# Patient Record
Sex: MALE | Race: WHITE | NOT HISPANIC OR LATINO | Employment: OTHER | URBAN - METROPOLITAN AREA
[De-identification: names, ages, dates, MRNs, and addresses within clinical notes are randomized per-mention and may not be internally consistent; named-entity substitution may affect disease eponyms.]

---

## 2018-07-07 ENCOUNTER — OFFICE VISIT (OUTPATIENT)
Dept: URGENT CARE | Facility: CLINIC | Age: 63
End: 2018-07-07
Payer: COMMERCIAL

## 2018-07-07 VITALS
HEIGHT: 75 IN | DIASTOLIC BLOOD PRESSURE: 72 MMHG | HEART RATE: 99 BPM | WEIGHT: 315 LBS | SYSTOLIC BLOOD PRESSURE: 145 MMHG | BODY MASS INDEX: 39.17 KG/M2 | OXYGEN SATURATION: 99 % | TEMPERATURE: 100.7 F

## 2018-07-07 DIAGNOSIS — N30.00 ACUTE CYSTITIS WITHOUT HEMATURIA: Primary | ICD-10-CM

## 2018-07-07 LAB
SL AMB  POCT GLUCOSE, UA: ABNORMAL
SL AMB LEUKOCYTE ESTERASE,UA: ABNORMAL
SL AMB POCT BILIRUBIN,UA: ABNORMAL
SL AMB POCT BLOOD,UA: ABNORMAL
SL AMB POCT CLARITY,UA: ABNORMAL
SL AMB POCT COLOR,UA: YELLOW
SL AMB POCT KETONES,UA: ABNORMAL
SL AMB POCT NITRITE,UA: ABNORMAL
SL AMB POCT PH,UA: 5
SL AMB POCT SPECIFIC GRAVITY,UA: 1.02
SL AMB POCT URINE PROTEIN: ABNORMAL
SL AMB POCT UROBILINOGEN: 0.2

## 2018-07-07 PROCEDURE — 87077 CULTURE AEROBIC IDENTIFY: CPT | Performed by: EMERGENCY MEDICINE

## 2018-07-07 PROCEDURE — 87186 SC STD MICRODIL/AGAR DIL: CPT | Performed by: EMERGENCY MEDICINE

## 2018-07-07 PROCEDURE — 81002 URINALYSIS NONAUTO W/O SCOPE: CPT | Performed by: EMERGENCY MEDICINE

## 2018-07-07 PROCEDURE — 87086 URINE CULTURE/COLONY COUNT: CPT | Performed by: EMERGENCY MEDICINE

## 2018-07-07 PROCEDURE — 99203 OFFICE O/P NEW LOW 30 MIN: CPT | Performed by: EMERGENCY MEDICINE

## 2018-07-07 RX ORDER — PHENAZOPYRIDINE HYDROCHLORIDE 200 MG/1
200 TABLET, FILM COATED ORAL
Qty: 10 TABLET | Refills: 0 | Status: SHIPPED | OUTPATIENT
Start: 2018-07-07 | End: 2021-04-15

## 2018-07-07 RX ORDER — LEVOTHYROXINE SODIUM 300 UG/1
300 TABLET ORAL DAILY
COMMUNITY

## 2018-07-07 RX ORDER — CIPROFLOXACIN 500 MG/1
500 TABLET, FILM COATED ORAL EVERY 12 HOURS SCHEDULED
Qty: 14 TABLET | Refills: 0 | Status: SHIPPED | OUTPATIENT
Start: 2018-07-07 | End: 2018-07-14

## 2018-07-07 NOTE — PROGRESS NOTES
[facility]        NAME: Mary Jimenez is a 58 y o  male  : 1955    MRN: [id number]  DATE: 2018  TIME: 3:58 PM    Assessment and Plan   Acute cystitis without hematuria [N30 00]  1  Acute cystitis without hematuria  ciprofloxacin (CIPRO) 500 mg tablet    phenazopyridine (PYRIDIUM) 200 mg tablet    POCT urine dip    Urine culture         Patient Instructions     Patient Instructions   Dysuria, Ambulatory Care   GENERAL INFORMATION:   Dysuria  is trouble urinating, or pain, burning, or discomfort when you urinate  Dysuria is usually a symptom of another problem, such as a blockage or urinary tract infection  Common symptoms include the following:   · Fever     · Cloudy, bad smelling urine     · Urge to urinate often but urinating little     · Back, side, or abdominal pain     · Blood in your urine     · Discharge that smells bad     · Itching  Seek immediate care for the following symptoms:   · Severe back, side, or abdominal pain    · A fever and shaking chills    · Vomiting several times in a row  Treatment for dysuria  may include medicines to treat a bacterial infection or help decrease bladder spasms  Manage your dysuria:   · Drink liquids as directed  Ask how much liquid to drink each day and which liquids are best for you  You may need to drink more liquid than usual to flush bacteria out of your body  · A sitz bath  may help decrease your pain  Healthcare providers may give you a portable sitz bath  This is a small tub that fits in the toilet  Fill the sitz bath or a bathtub with 4 to 6 inches of warm water  Sit in the warm water for 20 minutes 2 to 3 times a day  Follow up with your healthcare provider as directed:  Write down your questions so you remember to ask them during your visits  CARE AGREEMENT:   You have the right to help plan your care  Learn about your health condition and how it may be treated   Discuss treatment options with your caregivers to decide what care you want to receive  You always have the right to refuse treatment  The above information is an  only  It is not intended as medical advice for individual conditions or treatments  Talk to your doctor, nurse or pharmacist before following any medical regimen to see if it is safe and effective for you  © 2014 [address] is for End User's use only and may not be sold, redistributed or otherwise used for commercial purposes  All illustrations and images included in CareNotes® are the copyrighted property of [employer]  or [employer]  Follow up with PCP in 3-5 days  Proceed to  ER if symptoms worsen  Chief Complaint     Chief Complaint   Patient presents with    Urine Leakage     URINE FREQ, BURNING/LAST NIGHT         History of Present Illness       He complains of burning with urination and frequency of urination since last night  Denies fever chills sweats denies flank pain  Review of Systems   Review of Systems   Constitutional: Negative for appetite change, chills and fever  Respiratory: Negative for cough, shortness of breath and wheezing  Cardiovascular: Negative for chest pain and palpitations  Gastrointestinal: Negative for abdominal distention and abdominal pain  Genitourinary: Positive for dysuria and frequency  Negative for difficulty urinating and flank pain  Musculoskeletal: Negative for back pain and neck stiffness  Skin: Negative for pallor  Neurological: Negative for syncope, light-headedness and headaches           Current Medications       Current Outpatient Prescriptions:     ciprofloxacin (CIPRO) 500 mg tablet, Take 1 tablet (500 mg total) by mouth every 12 (twelve) hours for 7 days, Disp: 14 tablet, Rfl: 0    phenazopyridine (PYRIDIUM) 200 mg tablet, Take 1 tablet (200 mg total) by mouth 3 (three) times a day with meals, Disp: 10 tablet, Rfl: 0    Current Allergies     Allergies as of 07/07/2018 - Reviewed 07/07/2018   Allergen Reaction Noted    Bactrim [sulfamethoxazole-trimethoprim] Anaphylaxis 07/07/2018            The following portions of the patient's history were reviewed and updated as appropriate: allergies, current medications, past family history, past medical history, past social history, past surgical history and problem list      History reviewed  No pertinent past medical history  Past Surgical History:   Procedure Laterality Date    BRAIN TUMOR EXCISION      CHOLECYSTECTOMY      KIDNEY STONE SURGERY      KNEE SURGERY         Family History   Problem Relation Age of Onset    Cancer Mother     Cancer Father          Medications have been verified  Objective   /72 (BP Location: Left arm, Patient Position: Sitting, Cuff Size: Large)   Pulse 99   Temp (!) 100 7 °F (38 2 °C) (Tympanic)   Ht 6' 3" (1 905 m)   Wt (!) 179 kg (395 lb)   SpO2 99%   BMI 49 37 kg/m²        Physical Exam     Physical Exam   Constitutional: He is oriented to person, place, and time  He appears well-developed and well-nourished  No distress  Neck: Neck supple  Cardiovascular: Normal rate and regular rhythm  Pulmonary/Chest: Effort normal and breath sounds normal    Musculoskeletal: He exhibits no tenderness  Neurological: He is alert and oriented to person, place, and time  Skin: Skin is warm and dry  Nursing note and vitals reviewed

## 2018-07-07 NOTE — PATIENT INSTRUCTIONS
Dysuria, Ambulatory Care   GENERAL INFORMATION:   Dysuria  is trouble urinating, or pain, burning, or discomfort when you urinate  Dysuria is usually a symptom of another problem, such as a blockage or urinary tract infection  Common symptoms include the following:   · Fever     · Cloudy, bad smelling urine     · Urge to urinate often but urinating little     · Back, side, or abdominal pain     · Blood in your urine     · Discharge that smells bad     · Itching  Seek immediate care for the following symptoms:   · Severe back, side, or abdominal pain    · A fever and shaking chills    · Vomiting several times in a row  Treatment for dysuria  may include medicines to treat a bacterial infection or help decrease bladder spasms  Manage your dysuria:   · Drink liquids as directed  Ask how much liquid to drink each day and which liquids are best for you  You may need to drink more liquid than usual to flush bacteria out of your body  · A sitz bath  may help decrease your pain  Healthcare providers may give you a portable sitz bath  This is a small tub that fits in the toilet  Fill the sitz bath or a bathtub with 4 to 6 inches of warm water  Sit in the warm water for 20 minutes 2 to 3 times a day  Follow up with your healthcare provider as directed:  Write down your questions so you remember to ask them during your visits  CARE AGREEMENT:   You have the right to help plan your care  Learn about your health condition and how it may be treated  Discuss treatment options with your caregivers to decide what care you want to receive  You always have the right to refuse treatment  The above information is an  only  It is not intended as medical advice for individual conditions or treatments  Talk to your doctor, nurse or pharmacist before following any medical regimen to see if it is safe and effective for you    © 2014 [address] is for End User's use only and may not be sold, redistributed or otherwise used for commercial purposes  All illustrations and images included in CareNotes® are the copyrighted property of A D A M , Inc  or Kobe Beyer

## 2018-07-09 ENCOUNTER — TELEPHONE (OUTPATIENT)
Dept: URGENT CARE | Facility: CLINIC | Age: 63
End: 2018-07-09

## 2018-07-09 LAB — BACTERIA UR CULT: ABNORMAL

## 2018-07-10 ENCOUNTER — TELEPHONE (OUTPATIENT)
Dept: URGENT CARE | Facility: CLINIC | Age: 63
End: 2018-07-10

## 2018-07-10 DIAGNOSIS — N39.0 URINARY TRACT INFECTION WITHOUT HEMATURIA, SITE UNSPECIFIED: Primary | ICD-10-CM

## 2018-07-10 RX ORDER — LEVOFLOXACIN 500 MG/1
500 TABLET, FILM COATED ORAL EVERY 24 HOURS
Qty: 14 TABLET | Refills: 0 | Status: SHIPPED | OUTPATIENT
Start: 2018-07-10 | End: 2018-07-24

## 2021-01-05 ENCOUNTER — HOSPITAL ENCOUNTER (OUTPATIENT)
Dept: RADIOLOGY | Facility: HOSPITAL | Age: 66
Discharge: HOME | End: 2021-01-05
Payer: COMMERCIAL

## 2021-01-05 ENCOUNTER — TRANSCRIBE ORDERS (OUTPATIENT)
Dept: ADMINISTRATIVE | Facility: HOSPITAL | Age: 66
End: 2021-01-05

## 2021-01-05 DIAGNOSIS — R04.2 COUGHING UP BLOOD: Primary | ICD-10-CM

## 2021-01-05 DIAGNOSIS — R04.2 COUGHING UP BLOOD: ICD-10-CM

## 2021-01-05 PROCEDURE — 71046 X-RAY EXAM CHEST 2 VIEWS: CPT

## 2021-04-15 ENCOUNTER — HOSPITAL ENCOUNTER (OUTPATIENT)
Facility: HOSPITAL | Age: 66
Setting detail: OBSERVATION
Discharge: HOME | End: 2021-04-16
Attending: STUDENT IN AN ORGANIZED HEALTH CARE EDUCATION/TRAINING PROGRAM | Admitting: FAMILY MEDICINE
Payer: COMMERCIAL

## 2021-04-15 ENCOUNTER — APPOINTMENT (EMERGENCY)
Dept: RADIOLOGY | Facility: HOSPITAL | Age: 66
End: 2021-04-15
Payer: COMMERCIAL

## 2021-04-15 DIAGNOSIS — J12.82 PNEUMONIA DUE TO COVID-19 VIRUS: Primary | ICD-10-CM

## 2021-04-15 DIAGNOSIS — U07.1 PNEUMONIA DUE TO COVID-19 VIRUS: Primary | ICD-10-CM

## 2021-04-15 DIAGNOSIS — N17.9 AKI (ACUTE KIDNEY INJURY) (HCC): ICD-10-CM

## 2021-04-15 PROBLEM — I10 BENIGN ESSENTIAL HTN: Status: ACTIVE | Noted: 2021-04-15

## 2021-04-15 PROBLEM — E66.01 MORBID OBESITY WITH BMI OF 45.0-49.9, ADULT (HCC): Status: ACTIVE | Noted: 2021-04-15

## 2021-04-15 PROBLEM — E78.2 MIXED HYPERLIPIDEMIA: Status: ACTIVE | Noted: 2021-04-15

## 2021-04-15 PROBLEM — I50.32 CHRONIC DIASTOLIC CHF (CONGESTIVE HEART FAILURE) (HCC): Status: ACTIVE | Noted: 2021-04-15

## 2021-04-15 PROBLEM — G40.909 SEIZURE DISORDER (HCC): Status: ACTIVE | Noted: 2021-04-15

## 2021-04-15 PROBLEM — Z79.4 TYPE 2 DIABETES MELLITUS WITHOUT COMPLICATION, WITH LONG-TERM CURRENT USE OF INSULIN (HCC): Status: ACTIVE | Noted: 2021-04-15

## 2021-04-15 PROBLEM — E11.9 TYPE 2 DIABETES MELLITUS WITHOUT COMPLICATION, WITH LONG-TERM CURRENT USE OF INSULIN (HCC): Status: ACTIVE | Noted: 2021-04-15

## 2021-04-15 LAB
ALBUMIN SERPL BCP-MCNC: 3.3 G/DL (ref 3.5–5)
ALP SERPL-CCNC: 117 U/L (ref 46–116)
ALT SERPL W P-5'-P-CCNC: 61 U/L (ref 12–78)
ANION GAP SERPL CALCULATED.3IONS-SCNC: 6 MMOL/L (ref 4–13)
AST SERPL W P-5'-P-CCNC: 63 U/L (ref 5–45)
BASOPHILS # BLD AUTO: 0.01 THOUSANDS/ΜL (ref 0–0.1)
BASOPHILS NFR BLD AUTO: 0 % (ref 0–1)
BILIRUB SERPL-MCNC: 1.29 MG/DL (ref 0.2–1)
BUN SERPL-MCNC: 24 MG/DL (ref 5–25)
CALCIUM ALBUM COR SERPL-MCNC: 9 MG/DL (ref 8.3–10.1)
CALCIUM SERPL-MCNC: 8.4 MG/DL (ref 8.3–10.1)
CHLORIDE SERPL-SCNC: 105 MMOL/L (ref 100–108)
CO2 SERPL-SCNC: 27 MMOL/L (ref 21–32)
CREAT SERPL-MCNC: 1.54 MG/DL (ref 0.6–1.3)
CRP SERPL QL: 30.9 MG/L
D DIMER PPP FEU-MCNC: 0.39 UG/ML FEU
EOSINOPHIL # BLD AUTO: 0 THOUSAND/ΜL (ref 0–0.61)
EOSINOPHIL NFR BLD AUTO: 0 % (ref 0–6)
ERYTHROCYTE [DISTWIDTH] IN BLOOD BY AUTOMATED COUNT: 15.2 % (ref 11.6–15.1)
FERRITIN SERPL-MCNC: 665 NG/ML (ref 8–388)
GFR SERPL CREATININE-BSD FRML MDRD: 47 ML/MIN/1.73SQ M
GLUCOSE SERPL-MCNC: 117 MG/DL (ref 65–140)
GLUCOSE SERPL-MCNC: 137 MG/DL (ref 65–140)
GLUCOSE SERPL-MCNC: 166 MG/DL (ref 65–140)
GLUCOSE SERPL-MCNC: 174 MG/DL (ref 65–140)
HCT VFR BLD AUTO: 37.5 % (ref 36.5–49.3)
HGB BLD-MCNC: 12.7 G/DL (ref 12–17)
IMM GRANULOCYTES # BLD AUTO: 0.01 THOUSAND/UL (ref 0–0.2)
IMM GRANULOCYTES NFR BLD AUTO: 0 % (ref 0–2)
LYMPHOCYTES # BLD AUTO: 0.64 THOUSANDS/ΜL (ref 0.6–4.47)
LYMPHOCYTES NFR BLD AUTO: 20 % (ref 14–44)
MCH RBC QN AUTO: 30.1 PG (ref 26.8–34.3)
MCHC RBC AUTO-ENTMCNC: 33.9 G/DL (ref 31.4–37.4)
MCV RBC AUTO: 89 FL (ref 82–98)
MONOCYTES # BLD AUTO: 0.3 THOUSAND/ΜL (ref 0.17–1.22)
MONOCYTES NFR BLD AUTO: 10 % (ref 4–12)
NEUTROPHILS # BLD AUTO: 2.2 THOUSANDS/ΜL (ref 1.85–7.62)
NEUTS SEG NFR BLD AUTO: 70 % (ref 43–75)
NRBC BLD AUTO-RTO: 0 /100 WBCS
NT-PROBNP SERPL-MCNC: 79 PG/ML
PLATELET # BLD AUTO: 124 THOUSANDS/UL (ref 149–390)
PMV BLD AUTO: 10.1 FL (ref 8.9–12.7)
POTASSIUM SERPL-SCNC: 3.8 MMOL/L (ref 3.5–5.3)
PROCALCITONIN SERPL-MCNC: 0.09 NG/ML
PROT SERPL-MCNC: 6.7 G/DL (ref 6.4–8.2)
RBC # BLD AUTO: 4.22 MILLION/UL (ref 3.88–5.62)
SODIUM SERPL-SCNC: 138 MMOL/L (ref 136–145)
TROPONIN I SERPL-MCNC: 0.03 NG/ML
WBC # BLD AUTO: 3.16 THOUSAND/UL (ref 4.31–10.16)

## 2021-04-15 PROCEDURE — 87040 BLOOD CULTURE FOR BACTERIA: CPT | Performed by: STUDENT IN AN ORGANIZED HEALTH CARE EDUCATION/TRAINING PROGRAM

## 2021-04-15 PROCEDURE — 82948 REAGENT STRIP/BLOOD GLUCOSE: CPT

## 2021-04-15 PROCEDURE — 99285 EMERGENCY DEPT VISIT HI MDM: CPT

## 2021-04-15 PROCEDURE — 83880 ASSAY OF NATRIURETIC PEPTIDE: CPT | Performed by: STUDENT IN AN ORGANIZED HEALTH CARE EDUCATION/TRAINING PROGRAM

## 2021-04-15 PROCEDURE — 99220 PR INITIAL OBSERVATION CARE/DAY 70 MINUTES: CPT | Performed by: FAMILY MEDICINE

## 2021-04-15 PROCEDURE — 84484 ASSAY OF TROPONIN QUANT: CPT | Performed by: STUDENT IN AN ORGANIZED HEALTH CARE EDUCATION/TRAINING PROGRAM

## 2021-04-15 PROCEDURE — 82728 ASSAY OF FERRITIN: CPT | Performed by: STUDENT IN AN ORGANIZED HEALTH CARE EDUCATION/TRAINING PROGRAM

## 2021-04-15 PROCEDURE — 84145 PROCALCITONIN (PCT): CPT | Performed by: STUDENT IN AN ORGANIZED HEALTH CARE EDUCATION/TRAINING PROGRAM

## 2021-04-15 PROCEDURE — 36415 COLL VENOUS BLD VENIPUNCTURE: CPT | Performed by: STUDENT IN AN ORGANIZED HEALTH CARE EDUCATION/TRAINING PROGRAM

## 2021-04-15 PROCEDURE — 99285 EMERGENCY DEPT VISIT HI MDM: CPT | Performed by: STUDENT IN AN ORGANIZED HEALTH CARE EDUCATION/TRAINING PROGRAM

## 2021-04-15 PROCEDURE — 85025 COMPLETE CBC W/AUTO DIFF WBC: CPT | Performed by: STUDENT IN AN ORGANIZED HEALTH CARE EDUCATION/TRAINING PROGRAM

## 2021-04-15 PROCEDURE — 80053 COMPREHEN METABOLIC PANEL: CPT | Performed by: STUDENT IN AN ORGANIZED HEALTH CARE EDUCATION/TRAINING PROGRAM

## 2021-04-15 PROCEDURE — 85379 FIBRIN DEGRADATION QUANT: CPT | Performed by: STUDENT IN AN ORGANIZED HEALTH CARE EDUCATION/TRAINING PROGRAM

## 2021-04-15 PROCEDURE — 71045 X-RAY EXAM CHEST 1 VIEW: CPT

## 2021-04-15 PROCEDURE — 93005 ELECTROCARDIOGRAM TRACING: CPT

## 2021-04-15 PROCEDURE — 86140 C-REACTIVE PROTEIN: CPT | Performed by: STUDENT IN AN ORGANIZED HEALTH CARE EDUCATION/TRAINING PROGRAM

## 2021-04-15 RX ORDER — LEVOTHYROXINE SODIUM 0.15 MG/1
300 TABLET ORAL
Status: DISCONTINUED | OUTPATIENT
Start: 2021-04-15 | End: 2021-04-16 | Stop reason: HOSPADM

## 2021-04-15 RX ORDER — FAMOTIDINE 20 MG/1
20 TABLET, FILM COATED ORAL 2 TIMES DAILY
Status: DISCONTINUED | OUTPATIENT
Start: 2021-04-15 | End: 2021-04-16 | Stop reason: HOSPADM

## 2021-04-15 RX ORDER — MONTELUKAST SODIUM 10 MG/1
10 TABLET ORAL DAILY
Status: DISCONTINUED | OUTPATIENT
Start: 2021-04-15 | End: 2021-04-16 | Stop reason: HOSPADM

## 2021-04-15 RX ORDER — LOSARTAN POTASSIUM 50 MG/1
50 TABLET ORAL
COMMUNITY
Start: 2021-01-12

## 2021-04-15 RX ORDER — ACETAMINOPHEN 325 MG/1
650 TABLET ORAL EVERY 6 HOURS PRN
Status: DISCONTINUED | OUTPATIENT
Start: 2021-04-15 | End: 2021-04-16 | Stop reason: HOSPADM

## 2021-04-15 RX ORDER — PANTOPRAZOLE SODIUM 40 MG/1
40 TABLET, DELAYED RELEASE ORAL
Status: DISCONTINUED | OUTPATIENT
Start: 2021-04-16 | End: 2021-04-16 | Stop reason: HOSPADM

## 2021-04-15 RX ORDER — CEFTRIAXONE 1 G/50ML
1000 INJECTION, SOLUTION INTRAVENOUS ONCE
Status: COMPLETED | OUTPATIENT
Start: 2021-04-15 | End: 2021-04-15

## 2021-04-15 RX ORDER — MELATONIN
2000 DAILY
Status: DISCONTINUED | OUTPATIENT
Start: 2021-04-15 | End: 2021-04-16 | Stop reason: HOSPADM

## 2021-04-15 RX ORDER — ZINC SULFATE 50(220)MG
220 CAPSULE ORAL DAILY
Status: DISCONTINUED | OUTPATIENT
Start: 2021-04-15 | End: 2021-04-16 | Stop reason: HOSPADM

## 2021-04-15 RX ORDER — LEVETIRACETAM 500 MG/1
1000 TABLET ORAL EVERY 12 HOURS SCHEDULED
Status: DISCONTINUED | OUTPATIENT
Start: 2021-04-15 | End: 2021-04-16 | Stop reason: HOSPADM

## 2021-04-15 RX ORDER — ALLOPURINOL 300 MG/1
300 TABLET ORAL DAILY
Status: DISCONTINUED | OUTPATIENT
Start: 2021-04-15 | End: 2021-04-16 | Stop reason: HOSPADM

## 2021-04-15 RX ORDER — ONDANSETRON 2 MG/ML
4 INJECTION INTRAMUSCULAR; INTRAVENOUS EVERY 6 HOURS PRN
Status: DISCONTINUED | OUTPATIENT
Start: 2021-04-15 | End: 2021-04-16 | Stop reason: HOSPADM

## 2021-04-15 RX ORDER — LEVETIRACETAM 500 MG/1
1000 TABLET ORAL EVERY 12 HOURS SCHEDULED
COMMUNITY
Start: 2021-01-12 | End: 2022-01-12

## 2021-04-15 RX ORDER — PANTOPRAZOLE SODIUM 40 MG/1
40 TABLET, DELAYED RELEASE ORAL
COMMUNITY
Start: 2021-01-12

## 2021-04-15 RX ORDER — CALCIUM CARBONATE 200(500)MG
1000 TABLET,CHEWABLE ORAL DAILY PRN
Status: DISCONTINUED | OUTPATIENT
Start: 2021-04-15 | End: 2021-04-16 | Stop reason: HOSPADM

## 2021-04-15 RX ORDER — METOPROLOL SUCCINATE 100 MG/1
100 TABLET, EXTENDED RELEASE ORAL DAILY
Status: DISCONTINUED | OUTPATIENT
Start: 2021-04-15 | End: 2021-04-16 | Stop reason: HOSPADM

## 2021-04-15 RX ORDER — BENZONATATE 200 MG/1
200 CAPSULE ORAL
COMMUNITY
Start: 2021-04-12 | End: 2021-04-16 | Stop reason: HOSPADM

## 2021-04-15 RX ORDER — DOXYCYCLINE HYCLATE 100 MG/1
100 CAPSULE ORAL ONCE
Status: COMPLETED | OUTPATIENT
Start: 2021-04-15 | End: 2021-04-15

## 2021-04-15 RX ORDER — MONTELUKAST SODIUM 10 MG/1
10 TABLET ORAL
COMMUNITY
Start: 2021-01-12

## 2021-04-15 RX ORDER — ASCORBIC ACID 500 MG
1000 TABLET ORAL EVERY 12 HOURS SCHEDULED
Status: DISCONTINUED | OUTPATIENT
Start: 2021-04-15 | End: 2021-04-16 | Stop reason: HOSPADM

## 2021-04-15 RX ORDER — MULTIVITAMIN/IRON/FOLIC ACID 18MG-0.4MG
1 TABLET ORAL DAILY
Status: DISCONTINUED | OUTPATIENT
Start: 2021-04-22 | End: 2021-04-16 | Stop reason: HOSPADM

## 2021-04-15 RX ORDER — SODIUM CHLORIDE 9 MG/ML
75 INJECTION, SOLUTION INTRAVENOUS CONTINUOUS
Status: DISCONTINUED | OUTPATIENT
Start: 2021-04-15 | End: 2021-04-16

## 2021-04-15 RX ORDER — ATORVASTATIN CALCIUM 40 MG/1
40 TABLET, FILM COATED ORAL DAILY
Status: DISCONTINUED | OUTPATIENT
Start: 2021-04-15 | End: 2021-04-16 | Stop reason: HOSPADM

## 2021-04-15 RX ORDER — METOPROLOL SUCCINATE 100 MG/1
TABLET, EXTENDED RELEASE ORAL
COMMUNITY
Start: 2021-01-12

## 2021-04-15 RX ORDER — INSULIN GLARGINE 100 [IU]/ML
25 INJECTION, SOLUTION SUBCUTANEOUS EVERY 12 HOURS SCHEDULED
Status: DISCONTINUED | OUTPATIENT
Start: 2021-04-15 | End: 2021-04-16

## 2021-04-15 RX ORDER — GUAIFENESIN 600 MG
600 TABLET, EXTENDED RELEASE 12 HR ORAL ONCE
Status: COMPLETED | OUTPATIENT
Start: 2021-04-15 | End: 2021-04-15

## 2021-04-15 RX ADMIN — LEVETIRACETAM 1000 MG: 500 TABLET ORAL at 20:35

## 2021-04-15 RX ADMIN — ALLOPURINOL 300 MG: 300 TABLET ORAL at 14:23

## 2021-04-15 RX ADMIN — FAMOTIDINE 20 MG: 20 TABLET, FILM COATED ORAL at 17:43

## 2021-04-15 RX ADMIN — INSULIN GLARGINE 25 UNITS: 100 INJECTION, SOLUTION SUBCUTANEOUS at 21:50

## 2021-04-15 RX ADMIN — MONTELUKAST 10 MG: 10 TABLET, FILM COATED ORAL at 14:22

## 2021-04-15 RX ADMIN — OXYCODONE HYDROCHLORIDE AND ACETAMINOPHEN 1000 MG: 500 TABLET ORAL at 20:35

## 2021-04-15 RX ADMIN — CEFTRIAXONE 1000 MG: 1 INJECTION, SOLUTION INTRAVENOUS at 12:41

## 2021-04-15 RX ADMIN — SODIUM CHLORIDE 75 ML/HR: 0.9 INJECTION, SOLUTION INTRAVENOUS at 14:26

## 2021-04-15 RX ADMIN — INSULIN GLARGINE 25 UNITS: 100 INJECTION, SOLUTION SUBCUTANEOUS at 14:21

## 2021-04-15 RX ADMIN — LEVOTHYROXINE SODIUM 300 MCG: 150 TABLET ORAL at 14:23

## 2021-04-15 RX ADMIN — GUAIFENESIN 600 MG: 600 TABLET ORAL at 21:48

## 2021-04-15 RX ADMIN — ZINC SULFATE 220 MG (50 MG) CAPSULE 220 MG: CAPSULE at 14:22

## 2021-04-15 RX ADMIN — Medication 2000 UNITS: at 14:22

## 2021-04-15 RX ADMIN — METOPROLOL SUCCINATE 100 MG: 100 TABLET, EXTENDED RELEASE ORAL at 14:22

## 2021-04-15 RX ADMIN — INSULIN LISPRO 2 UNITS: 100 INJECTION, SOLUTION INTRAVENOUS; SUBCUTANEOUS at 16:33

## 2021-04-15 RX ADMIN — LEVETIRACETAM 1000 MG: 500 TABLET ORAL at 14:23

## 2021-04-15 RX ADMIN — APIXABAN 5 MG: 5 TABLET, FILM COATED ORAL at 17:42

## 2021-04-15 RX ADMIN — ATORVASTATIN CALCIUM 40 MG: 40 TABLET, FILM COATED ORAL at 14:23

## 2021-04-15 RX ADMIN — SODIUM CHLORIDE 1000 ML: 0.9 INJECTION, SOLUTION INTRAVENOUS at 12:28

## 2021-04-15 RX ADMIN — DOXYCYCLINE 100 MG: 100 CAPSULE ORAL at 12:42

## 2021-04-15 RX ADMIN — REMDESIVIR 200 MG: 100 INJECTION, POWDER, LYOPHILIZED, FOR SOLUTION INTRAVENOUS at 14:26

## 2021-04-15 RX ADMIN — OXYCODONE HYDROCHLORIDE AND ACETAMINOPHEN 1000 MG: 500 TABLET ORAL at 14:22

## 2021-04-15 NOTE — H&P
[address] 1955, 72 y o  male MRN: [id number]  Unit/Bed#: ED 08 Encounter: [id number]  Primary Care Provider: Curry Claude, PA-C   Date and time admitted to hospital: 4/15/2021 10:29 AM    * Pneumonia due to COVID-19 virus  Assessment & Plan  Patient states that he has been sick for almost 7-10 days  Initially his wife got COVID-19 infection and then he got it  Tested positive on 04/09/2021  Presented to ED because his pulse ox dropped to 50% on ambulation at home  Here his pulse ox is pain around 95-96% on room air but he is very symptomatic due to pneumonia  Chest x-ray shows  pneumonia due to COVID-19  Will place on oxygen if required  Will do ambulatory oxygen requirement study tomorrow  Place on vitamin-C, vitamin-D, zinc and multivitamin  No need for antibiotics as patient does not have a bacterial pneumonia at this time  Will place on remdesevir 1-2 doses  No steroids at this time due to being diabetic and not consistently needing oxygen  Continue Eliquis for anticoagulation as per home dose    JULISSA (acute kidney injury) ([facility] [address] )  Assessment & Plan  Patient's baseline creatinine is 0 9-1 1  Currently creatinine is up to 1 54 due to dehydration and poor oral intake along with diarrhea due to COVID-19  Placed on IV fluid hydration and recheck BMP tomorrow  Hold losartan and Lasix  Avoid nephrotoxin agents and hypotension  Monitor voiding    Chronic diastolic CHF (congestive heart failure) ([facility])  Assessment & Plan  Wt Readings from Last 3 Encounters:   04/15/21 (!) 176 kg (388 lb 0 2 oz)   07/07/18 (!) 179 kg (395 lb)     Currently appears to be hypovolemic  Hold Lasix and placed on IV fluids due to acute kidney injury  Monitor for now        Seizure disorder [facility])  Assessment & Plan  Stable at this time  Continue Keppra as per home dose    Mixed hyperlipidemia  Assessment & Plan  Mild transaminitis noted due to COVID-19 infection    Continue statin therapy for now and recheck CMP tomorrow    Morbid obesity with BMI of 45 0-49 9, adult [facility])  Assessment & Plan  Once medically cleared will need counseling on diet exercise and lifestyle modification    Type 2 diabetes mellitus without complication, with long-term current use of insulin [facility])  Assessment & Plan  Lab Results   Component Value Date    HGBA1C 8 3 (H) 02/17/2020       No results for input(s): POCGLU in the last 72 hours  Blood Sugar Average: Last 72 hrs:   patient uses tresiba 66 units twice daily at home  Oral intake is suboptimal and hence placed on diabetic diet along with insulin sliding scale and Lantus 25 units b i d   Monitor for hypoglycemia    Benign essential HTN  Assessment & Plan  Blood pressure is low normal   Hold losartan due to [place]   Continue metoprolol but decrease to 100 mg daily for now  Hold Lasix      VTE Prophylaxis: Apixaban (Eliquis)  / sequential compression device   Code Status:  Full code  POLST: There is no POLST form on file for this patient (pre-hospital)  Discussion with family:  None    Anticipated Length of Stay:  Patient will be admitted on an Observation basis with an anticipated length of stay of  less than 2 midnights  Justification for Hospital Stay:  Shortness of breath and fatigue    Total Time for Visit, including Counseling / Coordination of Care: 1 hour  Greater than 50% of this total time spent on direct patient counseling and coordination of care  Chief Complaint:   Shortness of breath    History of Present Illness:    Latha Keys is a 72 y o  male who presents with shortness of breath  Patient states that he has been sick with COVID-19 for almost 7-10 days  Initially his wife got it and then he got it  He has been having poor oral intake nausea diarrhea and worsening shortness of breath  His pulse ox at home documented oxygen saturation of 50-60%    Here at the emergency room his pulse ox has been in the mid 90s which drops with some ambulation into the 80s  Denies any fevers or chills at this time  Review of Systems:    Review of Systems   Constitutional: Positive for appetite change, chills and fatigue  Negative for fever  HENT: Negative for hearing loss, sore throat and trouble swallowing  Eyes: Negative for photophobia, discharge and visual disturbance  Respiratory: Positive for shortness of breath  Negative for chest tightness  Cardiovascular: Negative for chest pain and palpitations  Gastrointestinal: Positive for diarrhea  Negative for abdominal pain, blood in stool and vomiting  Endocrine: Negative for polydipsia and polyuria  Genitourinary: Negative for difficulty urinating, dysuria, flank pain and hematuria  Musculoskeletal: Negative for back pain and gait problem  Skin: Negative for rash  Allergic/Immunologic: Negative for environmental allergies and food allergies  Neurological: Positive for weakness  Negative for dizziness, seizures, syncope and headaches  Hematological: Does not bruise/bleed easily  Psychiatric/Behavioral: Negative for behavioral problems  All other systems reviewed and are negative  Past Medical and Surgical History:     Past Medical History:   Diagnosis Date    CHF [facility]congestive heart failure) ([facility] )     Diabetes mellitus ([facility] )        Past Surgical History:   Procedure Laterality Date    BRAIN TUMOR EXCISION      CHOLECYSTECTOMY      KIDNEY STONE SURGERY      KNEE SURGERY         Meds/Allergies:    Prior to Admission medications    Medication Sig Start Date End Date Taking?  Authorizing Provider   ALLOPURINOL PO Take 300 mg by mouth daily    Yes Historical Provider, MD   apixaban (ELIQUIS) 5 mg Take 5 mg by mouth 2 (two) times a day  1/12/21  Yes Historical Provider, MD   Atorvastatin Calcium (LIPITOR PO) Take 40 mg by mouth daily    Yes Historical Provider, MD   benzonatate (TESSALON) 200 MG capsule Take 200 mg by mouth 4/12/21 4/19/21 Yes Historical Provider, MD Furosemide (LASIX PO) Take 80 mg by mouth daily    Yes Historical Provider, MD   levETIRAcetam (KEPPRA) 500 mg tablet Take 1,000 mg by mouth every 12 (twelve) hours  1/12/21 1/12/22 Yes Historical Provider, MD   levothyroxine (SYNTHROID) 300 MCG tablet Take 300 mcg by mouth daily   Yes Historical Provider, MD   losartan (COZAAR) 50 mg tablet Take 50 mg by mouth 1/12/21  Yes Historical Provider, MD   metoprolol succinate (TOPROL-XL) 100 mg 24 hr tablet TAKE 1 5 TABLETS BY MOUTH EVERY EVENING 1/12/21  Yes Historical Provider, MD   montelukast (SINGULAIR) 10 mg tablet Take 10 mg by mouth 1/12/21  Yes Historical Provider, MD   pantoprazole (PROTONIX) 40 mg tablet Take 40 mg by mouth 1/12/21  Yes Historical Provider, MD   Potassium (POTASSIMIN PO) Take by mouth   Yes Historical Provider, MD   metFORMIN (GLUCOPHAGE) 500 mg tablet Take 500 mg by mouth  4/15/21  Historical Provider, MD   phenazopyridine (PYRIDIUM) 200 mg tablet Take 1 tablet (200 mg total) by mouth 3 (three) times a day with meals  Patient not taking: Reported on 4/15/2021 7/7/18 4/15/21  Gemini Jackson MD     I have reviewed home medications with patient personally  Allergies:    Allergies   Allergen Reactions    Bactrim [Sulfamethoxazole-Trimethoprim] Anaphylaxis       Social History:     Marital Status: /Civil Union    Social History     Substance and Sexual Activity   Alcohol Use No     Social History     Tobacco Use   Smoking Status Never Smoker   Smokeless Tobacco Never Used     Social History     Substance and Sexual Activity   Drug Use No       Family History:    Family History   Problem Relation Age of Onset    Cancer Mother     Cancer Father        Physical Exam:     Vitals:   Blood Pressure: 130/59 (04/15/21 1245)  Pulse: 79 (04/15/21 1245)  Temperature: 98 9 °F (37 2 °C) (04/15/21 1034)  Temp Source: Oral (04/15/21 1034)  Respirations: (!) 24 (04/15/21 1245)  Height: 6' 3" (190 5 cm) (04/15/21 1034)  Weight - Scale: (!) 176 kg (388 lb 0 2 oz) (04/15/21 1034)  SpO2: 98 % (04/15/21 1245)    Physical Exam  Vitals signs and nursing note reviewed  Constitutional:       Appearance: He is ill-appearing  HENT:      Head: Normocephalic and atraumatic  Right Ear: External ear normal       Left Ear: External ear normal       Nose: Nose normal       Mouth/Throat:      Mouth: Mucous membranes are dry  Eyes:      Pupils: Pupils are equal, round, and reactive to light  Neck:      Musculoskeletal: Normal range of motion and neck supple  Cardiovascular:      Rate and Rhythm: Normal rate and regular rhythm  Heart sounds: Normal heart sounds  Pulmonary:      Effort: Pulmonary effort is normal       Comments: Moderate air entry bilaterally with diminished breath sounds bilateral bases  Abdominal:      General: Bowel sounds are normal       Palpations: Abdomen is soft  Tenderness: There is no abdominal tenderness  Musculoskeletal: Normal range of motion  Skin:     General: Skin is warm and dry  Capillary Refill: Capillary refill takes less than 2 seconds  Neurological:      General: No focal deficit present  Mental Status: He is alert and oriented to person, place, and time  Psychiatric:         Mood and Affect: Mood normal            Additional Data:     Lab Results: I have personally reviewed pertinent reports        Results from last 7 days   Lab Units 04/15/21  1119   WBC Thousand/uL 3 16*   HEMOGLOBIN g/dL 12 7   HEMATOCRIT % 37 5   PLATELETS Thousands/uL 124*   NEUTROS PCT % 70   LYMPHS PCT % 20   MONOS PCT % 10   EOS PCT % 0     Results from last 7 days   Lab Units 04/15/21  1119   SODIUM mmol/L 138   POTASSIUM mmol/L 3 8   CHLORIDE mmol/L 105   CO2 mmol/L 27   BUN mg/dL 24   CREATININE mg/dL 1 54*   ANION GAP mmol/L 6   CALCIUM mg/dL 8 4   ALBUMIN g/dL 3 3*   TOTAL BILIRUBIN mg/dL 1 29*   ALK PHOS U/L 117*   ALT U/L 61   AST U/L 63*   GLUCOSE RANDOM mg/dL 174*                       Imaging: I have personally reviewed pertinent reports  XR chest 1 view portable   Final Result by Jovany Del Castillo MD (04/15 1202)      Right lower lobe groundglass opacity consistent with Covid 19 pneumonia  Workstation performed: [id number]             EKG, Pathology, and Other Studies Reviewed on Admission:   · EKG:  Sinus rhythm    Allscripts / Epic Records Reviewed: Yes     ** Please Note: This note has been constructed using a voice recognition system   **

## 2021-04-15 NOTE — ASSESSMENT & PLAN NOTE
Patient states that he has been sick for almost 7-10 days  Initially his wife got COVID-19 infection and then he got it  Tested positive on 04/09/2021  Presented to ED because his pulse ox dropped to 50% on ambulation at home  Here his pulse ox is pain around 95-96% on room air but he is very symptomatic due to pneumonia  Chest x-ray shows  pneumonia due to COVID-19  Will place on oxygen if required  Will do ambulatory oxygen requirement study tomorrow  Place on vitamin-C, vitamin-D, zinc and multivitamin  No need for antibiotics as patient does not have a bacterial pneumonia at this time  Will place on remdesevir 1-2 doses  No steroids at this time due to being diabetic and not consistently needing oxygen    Continue Eliquis for anticoagulation as per home dose

## 2021-04-15 NOTE — ASSESSMENT & PLAN NOTE
Lab Results   Component Value Date    HGBA1C 8 3 (H) 02/17/2020       No results for input(s): POCGLU in the last 72 hours  Blood Sugar Average: Last 72 hrs:   patient uses tresiba 66 units twice daily at home    Oral intake is suboptimal and hence placed on diabetic diet along with insulin sliding scale and Lantus 25 units b i d   Monitor for hypoglycemia

## 2021-04-15 NOTE — ASSESSMENT & PLAN NOTE
Blood pressure is low normal   Hold losartan due to [place]   Continue metoprolol but decrease to 100 mg daily for now    Hold Lasix

## 2021-04-15 NOTE — ASSESSMENT & PLAN NOTE
Patient's baseline creatinine is 0 9-1 1  Currently creatinine is up to 1 54 due to dehydration and poor oral intake along with diarrhea due to COVID-19  Placed on IV fluid hydration and recheck BMP tomorrow  Hold losartan and Lasix  Avoid nephrotoxin agents and hypotension    Monitor voiding

## 2021-04-15 NOTE — ASSESSMENT & PLAN NOTE
Wt Readings from Last 3 Encounters:   04/15/21 (!) 176 kg (388 lb 0 2 oz)   07/07/18 (!) 179 kg (395 lb)     Currently appears to be hypovolemic  Hold Lasix and placed on IV fluids due to acute kidney injury    Monitor for now

## 2021-04-15 NOTE — PLAN OF CARE
Problem: PAIN - ADULT  Goal: Verbalizes/displays adequate comfort level or baseline comfort level  Description: Interventions:  - Encourage patient to monitor pain and request assistance  - Assess pain using appropriate pain scale  - Administer analgesics based on type and severity of pain and evaluate response  - Implement non-pharmacological measures as appropriate and evaluate response  - Consider cultural and social influences on pain and pain management  - Notify physician/advanced practitioner if interventions unsuccessful or patient reports new pain  Outcome: Progressing     Problem: INFECTION - ADULT  Goal: Absence or prevention of progression during hospitalization  Description: INTERVENTIONS:  - Assess and monitor for signs and symptoms of infection  - Monitor lab/diagnostic results  - Monitor all insertion sites, i e  indwelling lines, tubes, and drains  - Monitor endotracheal if appropriate and nasal secretions for changes in amount and color  - [place] appropriate cooling/warming therapies per order  - Administer medications as ordered  - Instruct and encourage patient and family to use good hand hygiene technique  - Identify and instruct in appropriate isolation precautions for identified infection/condition  Outcome: Progressing  Goal: Absence of fever/infection during neutropenic period  Description: INTERVENTIONS:  - Monitor WBC    Outcome: Progressing     Problem: SAFETY ADULT  Goal: Patient will remain free of falls  Description: INTERVENTIONS:  - Assess patient frequently for physical needs  -  Identify cognitive and physical deficits and behaviors that affect risk of falls    -  [place] fall precautions as indicated by assessment   - Educate patient/family on patient safety including physical limitations  - Instruct patient to call for assistance with activity based on assessment  - Modify environment to reduce risk of injury  - Consider OT/PT consult to assist with strengthening/mobility  Outcome: Progressing  Goal: Maintain or return to baseline ADL function  Description: INTERVENTIONS:  -  Assess patient's ability to carry out ADLs; assess patient's baseline for ADL function and identify physical deficits which impact ability to perform ADLs (bathing, care of mouth/teeth, toileting, grooming, dressing, etc )  - Assess/evaluate cause of self-care deficits   - Assess range of motion  - Assess patient's mobility; develop plan if impaired  - Assess patient's need for assistive devices and provide as appropriate  - Encourage maximum independence but intervene and supervise when necessary  - Involve family in performance of ADLs  - Assess for home care needs following discharge   - Consider OT consult to assist with ADL evaluation and planning for discharge  - Provide patient education as appropriate  Outcome: Progressing  Goal: Maintain or return mobility status to optimal level  Description: INTERVENTIONS:  - Assess patient's baseline mobility status (ambulation, transfers, stairs, etc )    - Identify cognitive and physical deficits and behaviors that affect mobility  - Identify mobility aids required to assist with transfers and/or ambulation (gait belt, sit-to-stand, lift, walker, cane, etc )  - [place] fall precautions as indicated by assessment  - Record patient progress and toleration of activity level on Mobility SBAR; progress patient to next Phase/Stage  - Instruct patient to call for assistance with activity based on assessment  - Consider rehabilitation consult to assist with strengthening/weightbearing, etc   Outcome: Progressing     Problem: DISCHARGE PLANNING  Goal: Discharge to home or other facility with appropriate resources  Description: INTERVENTIONS:  - Identify barriers to discharge w/patient and caregiver  - Arrange for needed discharge resources and transportation as appropriate  - Identify discharge learning needs (meds, wound care, etc )  - Arrange for interpretive services to assist at discharge as needed  - Refer to Case Management Department for coordinating discharge planning if the patient needs post-hospital services based on physician/advanced practitioner order or complex needs related to functional status, cognitive ability, or social support system  Outcome: Progressing     Problem: Knowledge Deficit  Goal: Patient/family/caregiver demonstrates understanding of disease process, treatment plan, medications, and discharge instructions  Description: Complete learning assessment and assess knowledge base    Interventions:  - Provide teaching at level of understanding  - Provide teaching via preferred learning methods  Outcome: Progressing     Problem: CARDIOVASCULAR - ADULT  Goal: Maintains optimal cardiac output and hemodynamic stability  Description: INTERVENTIONS:  - Monitor I/O, vital signs and rhythm  - Monitor for S/S and trends of decreased cardiac output  - Administer and titrate ordered vasoactive medications to optimize hemodynamic stability  - Assess quality of pulses, skin color and temperature  - Assess for signs of decreased coronary artery perfusion  - Instruct patient to report change in severity of symptoms  Outcome: Progressing  Goal: Absence of cardiac dysrhythmias or at baseline rhythm  Description: INTERVENTIONS:  - Continuous cardiac monitoring, vital signs, obtain 12 lead EKG if ordered  - Administer antiarrhythmic and heart rate control medications as ordered  - Monitor electrolytes and administer replacement therapy as ordered  Outcome: Progressing     Problem: RESPIRATORY - ADULT  Goal: Achieves optimal ventilation and oxygenation  Description: INTERVENTIONS:  - Assess for changes in respiratory status  - Assess for changes in mentation and behavior  - Position to facilitate oxygenation and minimize respiratory effort  - Oxygen administered by appropriate delivery if ordered  - Initiate smoking cessation education as indicated  - Encourage broncho-pulmonary hygiene including cough, deep breathe, Incentive Spirometry  - Assess the need for suctioning and aspirate as needed  - Assess and instruct to report SOB or any respiratory difficulty  - Respiratory Therapy support as indicated  Outcome: Progressing     Problem: SKIN/TISSUE INTEGRITY - ADULT  Goal: Skin integrity remains intact  Description: INTERVENTIONS  - Identify patients at risk for skin breakdown  - Assess and monitor skin integrity  - Assess and monitor nutrition and hydration status  - Monitor labs (i e  albumin)  - Assess for incontinence   - Turn and reposition patient  - Assist with mobility/ambulation  - Relieve pressure over bony prominences  - Avoid friction and shearing  - Provide appropriate hygiene as needed including keeping skin clean and dry  - Evaluate need for skin moisturizer/barrier cream  - Collaborate with interdisciplinary team (i e  Nutrition, Rehabilitation, etc )   - Patient/family teaching  Outcome: Progressing  Goal: Incision(s), wounds(s) or drain site(s) healing without S/S of infection  Description: INTERVENTIONS  - Assess and document risk factors for skin impairment   - Assess and document dressing, incision, wound bed, drain sites and surrounding tissue  - Consider nutrition services referral as needed  - Oral mucous membranes remain intact  - Provide patient/ family education  Outcome: Progressing  Goal: Oral mucous membranes remain intact  Description: INTERVENTIONS  - Assess oral mucosa and hygiene practices  - Implement preventative oral hygiene regimen  - Implement oral medicated treatments as ordered  - Initiate Nutrition services referral as needed  Outcome: Progressing     Problem: MUSCULOSKELETAL - ADULT  Goal: Maintain or return mobility to safest level of function  Description: INTERVENTIONS:  - Assess patient's ability to carry out ADLs; assess patient's baseline for ADL function and identify physical deficits which impact ability to perform ADLs (bathing, care of mouth/teeth, toileting, grooming, dressing, etc )  - Assess/evaluate cause of self-care deficits   - Assess range of motion  - Assess patient's mobility  - Assess patient's need for assistive devices and provide as appropriate  - Encourage maximum independence but intervene and supervise when necessary  - Involve family in performance of ADLs  - Assess for home care needs following discharge   - Consider OT consult to assist with ADL evaluation and planning for discharge  - Provide patient education as appropriate  Outcome: Progressing  Goal: Maintain proper alignment of affected body part  Description: INTERVENTIONS:  - Support, maintain and protect limb and body alignment  - Provide patient/ family with appropriate education  Outcome: Progressing

## 2021-04-15 NOTE — ED NOTES
Patient ambulatory to bathroom with steady gait, patient maintained oxygen saturations between 96-97%        Freddie James RN  04/15/21 5549

## 2021-04-15 NOTE — ED PROVIDER NOTES
History  Chief Complaint   Patient presents with    Shortness of Breath     Pt tested covid + 4/9/21, reports calling PCP this morning due to uncontrollable cough after he wakes up  Pt also states he wears a oxygen monitor at home that the PCP office watches and they told him to call 911 due to it "being in the 70's"       Shortness of Breath  Severity:  Moderate  Onset quality:  Gradual  Duration:  5 days  Timing:  Constant  Progression:  Worsening  Chronicity:  New  Context: activity and URI    Relieved by:  Rest  Worsened by:  Coughing and movement  Ineffective treatments:  None tried  Associated symptoms: cough and sputum production    Associated symptoms: no abdominal pain, no chest pain, no fever, no hemoptysis, no neck pain, no rash, no syncope, no vomiting and no wheezing    Risk factors: hx of PE/DVT       72year old M  Diagnosed with COVID 19 on 4/9/21  Was having cough which prompted the testing  Having worsening shortness of breath over the past few months but more acutely worsening since being diagnosed with COVID  SOB is worse in the AM  Since diagnosed with COVID, he has been wearing a pulse oximeter and the readings have been sent to his PCP and [employer]  +productive cough (black?), sore throat, diarrhea, nausea, chills  The patient had a pulse ox reading of 74% on RA which prompted the ED evaluation  Wears BiPAP at night  Takes Eluquis 2/2 hx of DVT  Prior to Admission Medications   Prescriptions Last Dose Informant Patient Reported? Taking?    ALLOPURINOL PO  Self Yes Yes   Sig: Take 300 mg by mouth daily    Atorvastatin Calcium (LIPITOR PO)   Yes Yes   Sig: Take 40 mg by mouth daily    Furosemide (LASIX PO)   Yes Yes   Sig: Take 80 mg by mouth daily    Potassium (POTASSIMIN PO)   Yes Yes   Sig: Take by mouth   apixaban (ELIQUIS) 5 mg   Yes Yes   Sig: Take 5 mg by mouth 2 (two) times a day    benzonatate (TESSALON) 200 MG capsule   Yes Yes   Sig: Take 200 mg by mouth   levETIRAcetam (KEPPRA) 500 mg tablet   Yes Yes   Sig: Take 1,000 mg by mouth every 12 (twelve) hours    levothyroxine (SYNTHROID) 300 MCG tablet   Yes Yes   Sig: Take 300 mcg by mouth daily   losartan (COZAAR) 50 mg tablet   Yes Yes   Sig: Take 50 mg by mouth   metoprolol succinate (TOPROL-XL) 100 mg 24 hr tablet   Yes Yes   Sig: TAKE 1 5 TABLETS BY MOUTH EVERY EVENING   montelukast (SINGULAIR) 10 mg tablet   Yes Yes   Sig: Take 10 mg by mouth   pantoprazole (PROTONIX) 40 mg tablet   Yes Yes   Sig: Take 40 mg by mouth      Facility-Administered Medications: None       Past Medical History:   Diagnosis Date    CHF (congestive heart failure) (HCC)     Diabetes mellitus ([address] )        Past Surgical History:   Procedure Laterality Date    BRAIN TUMOR EXCISION      CHOLECYSTECTOMY      KIDNEY STONE SURGERY      KNEE SURGERY         Family History   Problem Relation Age of Onset    Cancer Mother     Cancer Father      I have reviewed and agree with the history as documented  E-Cigarette/Vaping    E-Cigarette Use Never User      E-Cigarette/Vaping Substances     Social History     Tobacco Use    Smoking status: Never Smoker    Smokeless tobacco: Never Used   Substance Use Topics    Alcohol use: Yes     Frequency: Monthly or less     Drinks per session: 1 or 2     Binge frequency: Never    Drug use: No     Review of Systems   Constitutional: Positive for chills  Negative for fever  HENT: Negative for congestion, rhinorrhea and sinus pain  Eyes: Negative for pain and visual disturbance  Respiratory: Positive for cough, sputum production and shortness of breath  Negative for hemoptysis, chest tightness and wheezing  Cardiovascular: Negative for chest pain, palpitations and syncope  Gastrointestinal: Negative for abdominal pain and vomiting  Genitourinary: Negative for dysuria and flank pain  Musculoskeletal: Negative for back pain and neck pain  Skin: Negative for color change and rash     Neurological: Negative for dizziness, weakness and light-headedness  Hematological: Bruises/bleeds easily  Psychiatric/Behavioral: Negative for confusion and decreased concentration  All other systems reviewed and are negative  Physical Exam  Physical Exam  Vitals signs and nursing note reviewed  Constitutional:       General: He is not in acute distress  Appearance: He is ill-appearing  He is not toxic-appearing  HENT:      Head: Normocephalic and atraumatic  Eyes:      Extraocular Movements: Extraocular movements intact  Pupils: Pupils are equal, round, and reactive to light  Neck:      Musculoskeletal: Neck supple  Cardiovascular:      Rate and Rhythm: Normal rate and regular rhythm  Pulmonary:      Effort: No tachypnea or accessory muscle usage  Comments: Coarse breath sounds bilaterally  Chest:      Chest wall: No tenderness  Abdominal:      Palpations: Abdomen is soft  Tenderness: There is no abdominal tenderness  There is no guarding or rebound  Musculoskeletal:      Right lower leg: He exhibits no tenderness  No edema  Left lower leg: He exhibits no tenderness  No edema  Skin:     General: Skin is warm and dry  Capillary Refill: Capillary refill takes less than 2 seconds  Neurological:      General: No focal deficit present  Mental Status: He is alert and oriented to person, place, and time  Cranial Nerves: No cranial nerve deficit  Motor: No weakness     Psychiatric:         Mood and Affect: Mood normal          Behavior: Behavior normal        Vital Signs  ED Triage Vitals [04/15/21 1034]   Temperature Pulse Respirations Blood Pressure SpO2   98 9 °F (37 2 °C) 80 20 133/60 96 %      Temp Source Heart Rate Source Patient Position - Orthostatic VS BP Location FiO2 (%)   Oral Monitor Lying Right arm --      Pain Score       No Pain         Vitals:    04/15/21 1100 04/15/21 1200 04/15/21 1215 04/15/21 1245   BP: 108/59 116/56 124/59 130/59   Pulse: 77 74 74 79   Patient Position - Orthostatic VS:    Lying     ED Medications  Medications   sodium chloride 0 9 % bolus 1,000 mL (1,000 mL Intravenous New Bag 4/15/21 1228)   cefTRIAXone (ROCEPHIN) IVPB (premix in dextrose) 1,000 mg 50 mL (1,000 mg Intravenous New Bag 4/15/21 1241)   doxycycline hyclate (VIBRAMYCIN) capsule 100 mg (100 mg Oral Given 4/15/21 1242)     Diagnostic Studies  Results Reviewed     Procedure Component Value Units Date/Time    Blood culture #1 [[id number]] Collected: 04/15/21 1240    Lab Status: In process Specimen: Blood from Arm, Left Updated: 04/15/21 1247    Blood culture #2 [[id number]] Collected: 04/15/21 1240    Lab Status: In process Specimen: Blood from Arm, Right Updated: 04/15/21 1247    Procalcitonin with AM Reflex [[id number]] Collected: 04/15/21 1240    Lab Status:  In process Specimen: Blood from Arm, Left Updated: 04/15/21 1247    Troponin I [[id number]]  (Normal) Collected: 04/15/21 1119    Lab Status: Final result Specimen: Blood from Arm, Left Updated: 04/15/21 1158     Troponin I 0 03 ng/mL     NT-BNP PRO [[id number]]  (Normal) Collected: 04/15/21 1119    Lab Status: Final result Specimen: Blood from Arm, Left Updated: 04/15/21 1157     NT-proBNP 79 pg/mL     C-reactive protein [[id number]]  (Abnormal) Collected: 04/15/21 1119    Lab Status: Final result Specimen: Blood from Arm, Left Updated: 04/15/21 1157     CRP 30 9 mg/L     Comprehensive metabolic panel [[id number]]  (Abnormal) Collected: 04/15/21 1119    Lab Status: Final result Specimen: Blood from Arm, Left Updated: 04/15/21 1153     Sodium 138 mmol/L      Potassium 3 8 mmol/L      Chloride 105 mmol/L      CO2 27 mmol/L      ANION GAP 6 mmol/L      BUN 24 mg/dL      Creatinine 1 54 mg/dL      Glucose 174 mg/dL      Calcium 8 4 mg/dL      Corrected Calcium 9 0 mg/dL      AST 63 U/L      ALT 61 U/L      Alkaline Phosphatase 117 U/L      Total Protein 6 7 g/dL      Albumin 3 3 g/dL      Total Bilirubin 1 29 mg/dL      eGFR 47 ml/min/1 73sq m     Narrative:      National Kidney Disease Foundation guidelines for Chronic Kidney Disease (CKD):     Stage 1 with normal or high GFR (GFR > 90 mL/min/1 73 square meters)    Stage 2 Mild CKD (GFR = 60-89 mL/min/1 73 square meters)    Stage 3A Moderate CKD (GFR = 45-59 mL/min/1 73 square meters)    Stage 3B Moderate CKD (GFR = 30-44 mL/min/1 73 square meters)    Stage 4 Severe CKD (GFR = 15-29 mL/min/1 73 square meters)    Stage 5 End Stage CKD (GFR <15 mL/min/1 73 square meters)  Note: GFR calculation is accurate only with a steady state creatinine    D-dimer, quantitative [[id number]]  (Normal) Collected: 04/15/21 1119    Lab Status: Final result Specimen: Blood from Arm, Left Updated: 04/15/21 1138     D-Dimer, Quant 0 39 ug/ml FEU     CBC and differential [[id number]]  (Abnormal) Collected: 04/15/21 1119    Lab Status: Final result Specimen: Blood from Arm, Left Updated: 04/15/21 1131     WBC 3 16 Thousand/uL      RBC 4 22 Million/uL      Hemoglobin 12 7 g/dL      Hematocrit 37 5 %      MCV 89 fL      MCH 30 1 pg      MCHC 33 9 g/dL      RDW 15 2 %      MPV 10 1 fL      Platelets 821 Thousands/uL      nRBC 0 /100 WBCs      Neutrophils Relative 70 %      Immat GRANS % 0 %      Lymphocytes Relative 20 %      Monocytes Relative 10 %      Eosinophils Relative 0 %      Basophils Relative 0 %      Neutrophils Absolute 2 20 Thousands/µL      Immature Grans Absolute 0 01 Thousand/uL      Lymphocytes Absolute 0 64 Thousands/µL      Monocytes Absolute 0 30 Thousand/µL      Eosinophils Absolute 0 00 Thousand/µL      Basophils Absolute 0 01 Thousands/µL     Ferritin [[id number]] Collected: 04/15/21 1119    Lab Status: In process Specimen: Blood from Arm, Left Updated: 04/15/21 1123             XR chest 1 view portable   Final Result by Debria Hatchet, MD (04/15 1202)      Right lower lobe groundglass opacity consistent with Covid 19 pneumonia                    Workstation performed: [id number] Procedures  ECG 12 Lead Documentation Only    Date/Time: 4/15/2021 10:45 AM  Performed by: Halyie Block DO  Authorized by: Haylie Block DO     Indications / Diagnosis:  SOB  Patient location:  ED  Previous ECG:     Previous ECG:  Unavailable    Comparison to cardiac monitor: Yes    Interpretation:     Interpretation: normal    Rate:     ECG rate:  77    ECG rate assessment: normal    Rhythm:     Rhythm: sinus rhythm    Ectopy:     Ectopy: none    QRS:     QRS axis:  Left    QRS intervals:  Normal  Conduction:     Conduction: normal    ST segments:     ST segments:  Normal  T waves:     T waves: normal        ED Course  ED Course as of Apr 15 1423   Thu Apr 15, 2021   1146 Chest x-ray concerning for right-sided pneumonia per my read  Official radiology interpretation is pending  MDM  Number of Diagnoses or Management Options  JULISSA (acute kidney injury) ([facility] ):   Pneumonia due to COVID-19 virus:   Diagnosis management comments: 79-year-old male  Recently diagnosed with COVID-19  Worsening shortness of breath over the past few days  Had an oxygen saturation of 74% on room air this morning which prompted ED evaluation  Chest x-ray concerning for right-sided pneumonia  D dimer negative, patient prescribed Eliquis  Low concern for PE  CRP mildly elevated  Mild JULISSA  The patient was administered a bolus of IVF along with Rocephin/Doxycycline  Admitted to IM  The patient was stable while under my care       Disposition  Final diagnoses:   Pneumonia due to COVID-19 virus   JULISSA [facility]acute kidney injury) ([facility] )     Time reflects when diagnosis was documented in both MDM as applicable and the Disposition within this note     Time User Action Codes Description Comment    4/15/2021 12:28 PM Fabiola Peoples Add [U07 1,  J12 82] Pneumonia due to COVID-19 virus     4/15/2021 12:28 PM Ayla Dougherty Add [N17 9] JULISSA (acute kidney injury) [facility])       ED Disposition     ED Disposition Condition Date/Time Comment Admit Stable u Apr 15, 2021 12:28 PM Case was discussed with Dr Aminata Calles and the patient's admission status was agreed to be Admission Status: observation status to the service of Dr Aminata Calles          Follow-up Information    None         Current Discharge Medication List      CONTINUE these medications which have NOT CHANGED    Details   ALLOPURINOL PO Take 300 mg by mouth daily       apixaban (ELIQUIS) 5 mg Take 5 mg by mouth 2 (two) times a day       Atorvastatin Calcium (LIPITOR PO) Take 40 mg by mouth daily       benzonatate (TESSALON) 200 MG capsule Take 200 mg by mouth      Furosemide (LASIX PO) Take 80 mg by mouth daily       levETIRAcetam (KEPPRA) 500 mg tablet Take 1,000 mg by mouth every 12 (twelve) hours       levothyroxine (SYNTHROID) 300 MCG tablet Take 300 mcg by mouth daily      losartan (COZAAR) 50 mg tablet Take 50 mg by mouth      metoprolol succinate (TOPROL-XL) 100 mg 24 hr tablet TAKE 1 5 TABLETS BY MOUTH EVERY EVENING      montelukast (SINGULAIR) 10 mg tablet Take 10 mg by mouth      pantoprazole (PROTONIX) 40 mg tablet Take 40 mg by mouth      Potassium (POTASSIMIN PO) Take by mouth           No discharge procedures on file      PDMP Review     None          ED Provider  Electronically Signed by           Radha Dumont DO  04/15/21 6036

## 2021-04-16 VITALS
WEIGHT: 315 LBS | HEIGHT: 75 IN | SYSTOLIC BLOOD PRESSURE: 135 MMHG | OXYGEN SATURATION: 94 % | TEMPERATURE: 99.1 F | HEART RATE: 75 BPM | BODY MASS INDEX: 39.17 KG/M2 | RESPIRATION RATE: 20 BRPM | DIASTOLIC BLOOD PRESSURE: 75 MMHG

## 2021-04-16 LAB
ALBUMIN SERPL BCP-MCNC: 3.1 G/DL (ref 3.5–5)
ALP SERPL-CCNC: 106 U/L (ref 46–116)
ALT SERPL W P-5'-P-CCNC: 51 U/L (ref 12–78)
ANION GAP SERPL CALCULATED.3IONS-SCNC: 12 MMOL/L (ref 4–13)
AST SERPL W P-5'-P-CCNC: 68 U/L (ref 5–45)
BILIRUB SERPL-MCNC: 0.93 MG/DL (ref 0.2–1)
BUN SERPL-MCNC: 22 MG/DL (ref 5–25)
CALCIUM ALBUM COR SERPL-MCNC: 9.1 MG/DL (ref 8.3–10.1)
CALCIUM SERPL-MCNC: 8.4 MG/DL (ref 8.3–10.1)
CHLORIDE SERPL-SCNC: 106 MMOL/L (ref 100–108)
CO2 SERPL-SCNC: 22 MMOL/L (ref 21–32)
CREAT SERPL-MCNC: 1.37 MG/DL (ref 0.6–1.3)
CRP SERPL QL: 44.7 MG/L
ERYTHROCYTE [DISTWIDTH] IN BLOOD BY AUTOMATED COUNT: 14.9 % (ref 11.6–15.1)
GFR SERPL CREATININE-BSD FRML MDRD: 54 ML/MIN/1.73SQ M
GLUCOSE SERPL-MCNC: 115 MG/DL (ref 65–140)
GLUCOSE SERPL-MCNC: 144 MG/DL (ref 65–140)
GLUCOSE SERPL-MCNC: 266 MG/DL (ref 65–140)
GLUCOSE SERPL-MCNC: 98 MG/DL (ref 65–140)
HCT VFR BLD AUTO: 34.6 % (ref 36.5–49.3)
HGB BLD-MCNC: 12 G/DL (ref 12–17)
MCH RBC QN AUTO: 30.5 PG (ref 26.8–34.3)
MCHC RBC AUTO-ENTMCNC: 34.7 G/DL (ref 31.4–37.4)
MCV RBC AUTO: 88 FL (ref 82–98)
PLATELET # BLD AUTO: 121 THOUSANDS/UL (ref 149–390)
PMV BLD AUTO: 10.3 FL (ref 8.9–12.7)
POTASSIUM SERPL-SCNC: 3.2 MMOL/L (ref 3.5–5.3)
PROCALCITONIN SERPL-MCNC: 0.1 NG/ML
PROT SERPL-MCNC: 6.5 G/DL (ref 6.4–8.2)
RBC # BLD AUTO: 3.94 MILLION/UL (ref 3.88–5.62)
SODIUM SERPL-SCNC: 140 MMOL/L (ref 136–145)
TSH SERPL DL<=0.05 MIU/L-ACNC: 4.28 UIU/ML (ref 0.36–3.74)
WBC # BLD AUTO: 3.92 THOUSAND/UL (ref 4.31–10.16)

## 2021-04-16 PROCEDURE — 94760 N-INVAS EAR/PLS OXIMETRY 1: CPT

## 2021-04-16 PROCEDURE — 80053 COMPREHEN METABOLIC PANEL: CPT | Performed by: FAMILY MEDICINE

## 2021-04-16 PROCEDURE — 85027 COMPLETE CBC AUTOMATED: CPT | Performed by: FAMILY MEDICINE

## 2021-04-16 PROCEDURE — 84145 PROCALCITONIN (PCT): CPT | Performed by: STUDENT IN AN ORGANIZED HEALTH CARE EDUCATION/TRAINING PROGRAM

## 2021-04-16 PROCEDURE — 84443 ASSAY THYROID STIM HORMONE: CPT | Performed by: FAMILY MEDICINE

## 2021-04-16 PROCEDURE — 82948 REAGENT STRIP/BLOOD GLUCOSE: CPT

## 2021-04-16 PROCEDURE — 94660 CPAP INITIATION&MGMT: CPT

## 2021-04-16 PROCEDURE — 99217 PR OBSERVATION CARE DISCHARGE MANAGEMENT: CPT | Performed by: FAMILY MEDICINE

## 2021-04-16 PROCEDURE — 86140 C-REACTIVE PROTEIN: CPT | Performed by: FAMILY MEDICINE

## 2021-04-16 RX ORDER — HYDROCODONE POLISTIREX AND CHLORPHENIRAMINE POLISTIREX 10; 8 MG/5ML; MG/5ML
5 SUSPENSION, EXTENDED RELEASE ORAL EVERY 12 HOURS PRN
Status: DISCONTINUED | OUTPATIENT
Start: 2021-04-16 | End: 2021-04-16 | Stop reason: HOSPADM

## 2021-04-16 RX ORDER — GUAIFENESIN 600 MG
600 TABLET, EXTENDED RELEASE 12 HR ORAL EVERY 12 HOURS SCHEDULED
Status: DISCONTINUED | OUTPATIENT
Start: 2021-04-16 | End: 2021-04-16 | Stop reason: HOSPADM

## 2021-04-16 RX ORDER — GUAIFENESIN 600 MG
600 TABLET, EXTENDED RELEASE 12 HR ORAL EVERY 12 HOURS SCHEDULED
Qty: 28 TABLET | Refills: 0 | Status: SHIPPED | OUTPATIENT
Start: 2021-04-16 | End: 2021-04-30

## 2021-04-16 RX ORDER — ALBUTEROL SULFATE 90 UG/1
2 AEROSOL, METERED RESPIRATORY (INHALATION) EVERY 4 HOURS PRN
Status: DISCONTINUED | OUTPATIENT
Start: 2021-04-16 | End: 2021-04-16 | Stop reason: HOSPADM

## 2021-04-16 RX ORDER — ACETAMINOPHEN 325 MG/1
650 TABLET ORAL EVERY 6 HOURS PRN
Refills: 0
Start: 2021-04-16

## 2021-04-16 RX ORDER — ALBUTEROL SULFATE 90 UG/1
2 AEROSOL, METERED RESPIRATORY (INHALATION) EVERY 4 HOURS PRN
Qty: 1 INHALER | Refills: 0 | Status: SHIPPED | OUTPATIENT
Start: 2021-04-16

## 2021-04-16 RX ORDER — POTASSIUM CHLORIDE 20 MEQ/1
40 TABLET, EXTENDED RELEASE ORAL ONCE
Status: COMPLETED | OUTPATIENT
Start: 2021-04-16 | End: 2021-04-16

## 2021-04-16 RX ORDER — PREDNISONE 20 MG/1
20 TABLET ORAL DAILY
Status: DISCONTINUED | OUTPATIENT
Start: 2021-04-16 | End: 2021-04-16 | Stop reason: HOSPADM

## 2021-04-16 RX ORDER — PREDNISONE 20 MG/1
20 TABLET ORAL DAILY
Qty: 5 TABLET | Refills: 0 | Status: SHIPPED | OUTPATIENT
Start: 2021-04-17 | End: 2021-04-22

## 2021-04-16 RX ORDER — MELATONIN
2000 DAILY
Qty: 24 TABLET | Refills: 0 | Status: SHIPPED | OUTPATIENT
Start: 2021-04-17 | End: 2021-04-29

## 2021-04-16 RX ORDER — HYDROCODONE POLISTIREX AND CHLORPHENIRAMINE POLISTIREX 10; 8 MG/5ML; MG/5ML
5 SUSPENSION, EXTENDED RELEASE ORAL EVERY 12 HOURS PRN
Qty: 120 ML | Refills: 0 | Status: SHIPPED | OUTPATIENT
Start: 2021-04-16 | End: 2021-04-26

## 2021-04-16 RX ORDER — INSULIN GLARGINE 100 [IU]/ML
35 INJECTION, SOLUTION SUBCUTANEOUS EVERY 12 HOURS SCHEDULED
Status: DISCONTINUED | OUTPATIENT
Start: 2021-04-16 | End: 2021-04-16 | Stop reason: HOSPADM

## 2021-04-16 RX ORDER — INSULIN DEGLUDEC INJECTION 100 U/ML
60 INJECTION, SOLUTION SUBCUTANEOUS EVERY 12 HOURS SCHEDULED
Qty: 15 ML | Refills: 0
Start: 2021-04-16

## 2021-04-16 RX ORDER — ZINC SULFATE 50(220)MG
220 CAPSULE ORAL DAILY
Qty: 5 CAPSULE | Refills: 0 | Status: SHIPPED | OUTPATIENT
Start: 2021-04-17 | End: 2021-04-22

## 2021-04-16 RX ORDER — INSULIN GLARGINE 100 [IU]/ML
10 INJECTION, SOLUTION SUBCUTANEOUS ONCE
Status: COMPLETED | OUTPATIENT
Start: 2021-04-16 | End: 2021-04-16

## 2021-04-16 RX ORDER — AZITHROMYCIN 250 MG/1
250 TABLET, FILM COATED ORAL EVERY 24 HOURS
Qty: 5 TABLET | Refills: 0 | Status: SHIPPED | OUTPATIENT
Start: 2021-04-16 | End: 2021-04-21

## 2021-04-16 RX ADMIN — FAMOTIDINE 20 MG: 20 TABLET, FILM COATED ORAL at 17:25

## 2021-04-16 RX ADMIN — MONTELUKAST 10 MG: 10 TABLET, FILM COATED ORAL at 08:07

## 2021-04-16 RX ADMIN — APIXABAN 5 MG: 5 TABLET, FILM COATED ORAL at 08:06

## 2021-04-16 RX ADMIN — PANTOPRAZOLE SODIUM 40 MG: 40 TABLET, DELAYED RELEASE ORAL at 05:54

## 2021-04-16 RX ADMIN — INSULIN LISPRO 2 UNITS: 100 INJECTION, SOLUTION INTRAVENOUS; SUBCUTANEOUS at 17:27

## 2021-04-16 RX ADMIN — FAMOTIDINE 20 MG: 20 TABLET, FILM COATED ORAL at 08:06

## 2021-04-16 RX ADMIN — GUAIFENESIN 600 MG: 600 TABLET ORAL at 10:19

## 2021-04-16 RX ADMIN — ALLOPURINOL 300 MG: 300 TABLET ORAL at 08:06

## 2021-04-16 RX ADMIN — HYDROCODONE POLISTIREX AND CHLORPHENIRAMINE POLISTIREX 5 ML: 10; 8 SUSPENSION, EXTENDED RELEASE ORAL at 10:19

## 2021-04-16 RX ADMIN — PREDNISONE 20 MG: 20 TABLET ORAL at 10:19

## 2021-04-16 RX ADMIN — LEVETIRACETAM 1000 MG: 500 TABLET ORAL at 08:06

## 2021-04-16 RX ADMIN — OXYCODONE HYDROCHLORIDE AND ACETAMINOPHEN 1000 MG: 500 TABLET ORAL at 08:06

## 2021-04-16 RX ADMIN — REMDESIVIR 100 MG: 100 INJECTION, POWDER, LYOPHILIZED, FOR SOLUTION INTRAVENOUS at 14:34

## 2021-04-16 RX ADMIN — METOPROLOL SUCCINATE 100 MG: 100 TABLET, EXTENDED RELEASE ORAL at 08:07

## 2021-04-16 RX ADMIN — APIXABAN 5 MG: 5 TABLET, FILM COATED ORAL at 17:25

## 2021-04-16 RX ADMIN — SODIUM CHLORIDE 75 ML/HR: 0.9 INJECTION, SOLUTION INTRAVENOUS at 02:42

## 2021-04-16 RX ADMIN — INSULIN GLARGINE 25 UNITS: 100 INJECTION, SOLUTION SUBCUTANEOUS at 08:07

## 2021-04-16 RX ADMIN — ZINC SULFATE 220 MG (50 MG) CAPSULE 220 MG: CAPSULE at 08:07

## 2021-04-16 RX ADMIN — Medication 2000 UNITS: at 08:07

## 2021-04-16 RX ADMIN — ATORVASTATIN CALCIUM 40 MG: 40 TABLET, FILM COATED ORAL at 08:07

## 2021-04-16 RX ADMIN — LEVOTHYROXINE SODIUM 300 MCG: 150 TABLET ORAL at 05:54

## 2021-04-16 RX ADMIN — POTASSIUM CHLORIDE 40 MEQ: 1500 TABLET, EXTENDED RELEASE ORAL at 10:18

## 2021-04-16 RX ADMIN — INSULIN GLARGINE 10 UNITS: 100 INJECTION, SOLUTION SUBCUTANEOUS at 11:55

## 2021-04-16 NOTE — PROGRESS NOTES
BMI of 50 59  Type 2 DM, elevated POC BS levels  High estimated needs given body habitus  Pt with noted allergy to aspartame  Will order double protein portions to help meet estimated needs  CHF currently hypovolemic, will order modest 4 gm GOMEZ restriction

## 2021-04-16 NOTE — ASSESSMENT & PLAN NOTE
Blood pressure is low normal   Hold losartan due to [place]   Continue metoprolol but decrease to 100 mg daily for now    Hold Lasix

## 2021-04-16 NOTE — MALNUTRITION/BMI
This medical record reflects one or more clinical indicators suggestive of morbid obesity  BMI Findings:  Adult BMI Classifications: Morbid Obesity 50-59 9     Body mass index is 50 59 kg/m²  See Nutrition note dated 4/16/21 for additional details  Completed nutrition assessment is viewable in the nutrition documentation

## 2021-04-16 NOTE — DISCHARGE SUMMARY
[address]  Discharge- Angie Katz 1955, 72 y o  male MRN: [id number]  Unit/Bed#: -01 Encounter: [id number]  Primary Care Provider: Bill Farooq PA-C   Date and time admitted to hospital: 4/15/2021 10:29 AM    * Pneumonia due to COVID-19 virus  Assessment & Plan  Patient states that he has been sick for almost 7-10 days  Initially his wife got COVID-19 infection and then he got it  Tested positive on 04/09/2021  Presented to ED because his pulse ox dropped to 50% on ambulation at home  Here his pulse ox is pain around 95-96% on room air but he is very symptomatic due to pneumonia  Chest x-ray shows  pneumonia due to COVID-19  Place on vitamin-C, vitamin-D, zinc and multivitamin  No need for antibiotics as patient does not have a bacterial pneumonia at this time  Will place on remdesevir 1-2 doses  Placed on a short taper of prednisone due to bad cough and congestion and also placed albuterol inhaler along with cough medicine  Continue Eliquis for anticoagulation as per home dose    JULISSA (acute kidney injury) ([facility] [address] )  Assessment & Plan  Patient's baseline creatinine is 0 9-1 1  Currently creatinine is up to 1 54 due to dehydration and poor oral intake along with diarrhea due to COVID-19  Placed on IV fluid hydration and repeat creatinine down to 1 3  Hold losartan for 5 days outpatient and restart lasix  Avoid nephrotoxin agents and hypotension  Monitor voiding    Chronic diastolic CHF (congestive heart failure) ([facility])  Assessment & Plan  Wt Readings from Last 3 Encounters:   04/16/21 (!) 184 kg (404 lb 12 2 oz)   07/07/18 (!) 179 kg (395 lb)     Currently appears to be hypovolemic  Restart Lasix tomorrow        Seizure disorder [facility])  Assessment & Plan  Stable at this time  Continue Keppra as per home dose    Mixed hyperlipidemia  Assessment & Plan  Mild transaminitis noted due to COVID-19 infection  Continue statin therapy     LFTs improving    Morbid obesity with BMI of 45 0-49 9, adult [facility])  Assessment & Plan  Once medically cleared will need counseling on diet exercise and lifestyle modification    Type 2 diabetes mellitus without complication, with long-term current use of insulin [facility])  Assessment & Plan  Lab Results   Component Value Date    HGBA1C 8 3 (H) 02/17/2020       Recent Labs     04/15/21  1559 04/15/21  2141 04/16/21  0308 04/16/21  0739   POCGLU 166* 137 266* 115       Blood Sugar Average: Last 72 hrs:  (P) 160 2 patient uses tresiba 66 units twice daily at home  Oral intake is suboptimal and hence placed on reduced regimen while inpatient  Will place on a short course of steroids outpatient and hence resume home dose    Benign essential HTN  Assessment & Plan  Blood pressure is low normal   Hold losartan due to [place]   Continue metoprolol but decrease to 100 mg daily for now  Hold Lasix      Discharging Physician / Practitioner: Ladonna Galloway MD  PCP: Sundar Rivero PA-C  Admission Date:   Admission Orders (From admission, onward)     Ordered        04/15/21 1228  Place in Observation  Once                   Discharge Date: 04/16/21    Resolved Problems  Date Reviewed: 4/16/2021    None          Consultations During Hospital Stay:  · None    Procedures Performed:   · None    Significant Findings / Test Results:   Xr Chest 1 View Portable    Result Date: 4/15/2021  Impression: Right lower lobe groundglass opacity consistent with Covid 19 pneumonia  Workstation performed: [id number]     Incidental Findings:   · None     Test Results Pending at Discharge (will require follow up): · None     Outpatient Tests Requested:  · BMP in 2 weeks    Complications:  None    Reason for Admission:  Shortness of breath    Hospital Course:     Wes Vargas is a 72 y o  male patient who originally presented to the hospital on 4/15/2021 due to worsening shortness of breath cough and fatigue    Patient states that his pulse ox registered low oxygen saturation when he walked and he was instructed to go to the emergency room  Found to have pneumonia due to COVID-19 on chest x-ray  Received 2 doses of remdesivir, vitamin-C, vitamin-D, zinc and multivitamin along with a short course of steroids  Will be discharged home today it is not requiring any oxygen and advised to quarantine at home for another 1 week  Also found have acute kidney injury for which he received IV fluid hydration following which his kidney numbers have started to improve        Please see above list of diagnoses and related plan for additional information  Condition at Discharge: good     Discharge Day Visit / Exam:     Subjective:  Patient denies any chest pain or shortness of breath but states that he is bothered by a hacking cough  Denies any diarrhea  Appetite is slowly improving  Vitals: Blood Pressure: 129/63 (04/16/21 0700)  Pulse: 82 (04/16/21 0800)  Temperature: (!) 100 9 °F (38 3 °C) (04/16/21 0700)  Temp Source: Temporal (04/16/21 0700)  Respirations: 20 (04/16/21 0820)  Height: 6' 3" (190 5 cm) (04/16/21 0908)  Weight - Scale: (!) 184 kg (404 lb 12 2 oz) (04/16/21 0554)  SpO2: 95 % (04/16/21 0800)  Exam:   Physical Exam  Vitals signs and nursing note reviewed  Constitutional:       Appearance: He is obese  He is ill-appearing  HENT:      Head: Normocephalic and atraumatic  Right Ear: External ear normal       Left Ear: External ear normal       Nose: Nose normal       Mouth/Throat:      Pharynx: Oropharynx is clear  Eyes:      Pupils: Pupils are equal, round, and reactive to light  Neck:      Musculoskeletal: Normal range of motion and neck supple  Cardiovascular:      Rate and Rhythm: Normal rate and regular rhythm  Heart sounds: Normal heart sounds     Pulmonary:      Effort: Pulmonary effort is normal       Comments: Moderate air entry bilaterally with diminished breath sounds bilateral bases  Abdominal:      General: Bowel sounds are normal       Palpations: Abdomen is soft  Tenderness: There is no abdominal tenderness  Musculoskeletal: Normal range of motion  Skin:     General: Skin is warm and dry  Capillary Refill: Capillary refill takes less than 2 seconds  Neurological:      General: No focal deficit present  Mental Status: He is alert and oriented to person, place, and time  Psychiatric:         Mood and Affect: Mood normal          Discussion with Family:  None    Discharge instructions/Information to patient and family:   See after visit summary for information provided to patient and family  Provisions for Follow-Up Care:  See after visit summary for information related to follow-up care and any pertinent home health orders  Disposition:     Home    For Discharges to [facility] SNF:   · Not Applicable to this Patient - Not Applicable to this Patient    Planned Readmission:  None     Discharge Statement:  I spent 35 minutes discharging the patient  This time was spent on the day of discharge  I had direct contact with the patient on the day of discharge  Greater than 50% of the total time was spent examining patient, answering all patient questions, arranging and discussing plan of care with patient as well as directly providing post-discharge instructions  Additional time then spent on discharge activities  Discharge Medications:  See after visit summary for reconciled discharge medications provided to patient and family        ** Please Note: This note has been constructed using a voice recognition system **

## 2021-04-16 NOTE — ASSESSMENT & PLAN NOTE
Lab Results   Component Value Date    HGBA1C 8 3 (H) 02/17/2020       Recent Labs     04/15/21  1559 04/15/21  2141 04/16/21  0308 04/16/21  0739   POCGLU 166* 137 266* 115       Blood Sugar Average: Last 72 hrs:  (P) 160 2 patient uses tresiba 66 units twice daily at home  Oral intake is suboptimal and hence placed on reduced regimen while inpatient    Will place on a short course of steroids outpatient and hence resume home dose

## 2021-04-16 NOTE — UTILIZATION REVIEW
Initial Clinical Review    Admission: Date/Time/Statement:   Admission Orders (From admission, onward)     Ordered        04/15/21 1228  Place in Observation  Once                   Orders Placed This Encounter   Procedures    Place in Observation     Standing Status:   Standing     Number of Occurrences:   1     Order Specific Question:   Level of Care     Answer:   Med Surg [16]     ED Arrival Information     Expected Arrival Acuity Means of Arrival Escorted By Service Admission Type    - 4/15/2021 10:29 Urgent Ambulance [facility] Hospitalist Urgent    Arrival Complaint    sob, C+        Chief Complaint   Patient presents with    Shortness of Breath     Pt tested covid + 4/9/21, reports calling PCP this morning due to uncontrollable cough after he wakes up  Pt also states he wears a oxygen monitor at home that the PCP office watches and they told him to call 911 due to it "being in the 70's"       Initial Presentation:   72 y o  male  with PMH of CHF, seizures, DM2, HTN, HL and morbid obesity who presents to the ED from home with shortness of breath  Patient states that he has been sick with COVID-19 for almost 7-10 days  He has been having poor oral intake nausea diarrhea and worsening shortness of breath  His pulse ox at home documented oxygen saturation of 50-60%  Here at the emergency room his pulse ox has been in the mid 90s which drops with ambulation into the 80s  ED CXR showed pneumonia due to COVID-19  ED labs revealed elevated creatinine (baseline 0 9-1 1)  Physical exam: diminished breath sounds B/L bases, mucous membranes dry  Plan: Inpatient Med Surg admission for evaluation and treatment of pneumonia due to COVID-19 virus and JULISSA:  Remdesevir, Vitamin C, D, zinc and MVI, ambulatory oxygen study  IV fluid, hold Lasix and losartan, avoid nephrotoxin agents and hypotension  CMP in a m         Date: 4/16/21 Day 2:    ED Triage Vitals [04/15/21 1034]   Temperature Pulse Respirations Blood Pressure SpO2   98 9 °F (37 2 °C) 80 20 133/60 96 %      Temp Source Heart Rate Source Patient Position - Orthostatic VS BP Location FiO2 (%)   Oral Monitor Lying Right arm --      Pain Score       No Pain          Wt Readings from Last 1 Encounters:   04/16/21 (!) 184 kg (404 lb 12 2 oz)     Additional Vital Signs:     Date/Time  Temp  Pulse  Resp  BP  MAP  SpO2 O2 Device   04/16/21 0800  --  82  20  --  -- 95 % None (Room air)   04/16/21 0700  100 9 °F (38 3 °C)Abnormal   81  22  129/63  90 95 % None (Room air)   04/16/21 0600  100 3 °F (37 9 °C)  85  --  --  -- 93 % None (Room air)   04/16/21 0554  --  83  --  134/76  100 94 % --   04/16/21 0500  --  79  20  134/76  100 95 % BiPAP   04/16/21 0400  --  81  --  --  -- 95 % --   04/16/21 0300  --  83  --  --  -- 96 % BiPAP    O2 Device: states,wears Bipap at night at 04/16/21 0300   04/16/21 0200  --  83  --  --  -- 92 % --   04/16/21 0100  --  83  --  --  -- 91 % --   04/16/21 0000  98 4 °F (36 9 °C)  87  27Abnormal   123/80  94 93 % None (Room air)   04/15/21 2300  --  87  --  --  -- 89 %Abnormal  --   04/15/21 2200  --  98  --  --  -- 96 % --   04/15/21 2100  --  89  --  --  -- 91 % --   04/15/21 2000  98 6 °F (37 °C)  90  28Abnormal   147/91  111 91 % None (Room air)   04/15/21 1900  --  84  27Abnormal   --  -- 91 % --   04/15/21 1600  --  80  27Abnormal   --  -- 92 % --   04/15/21 1511  98 6 °F (37 °C)  75  23Abnormal   136/65  94 95 % None (Room air)   04/15/21 1245  98 3 °F (36 8 °C)  79  24Abnormal   130/59  85 98 % None (Room air)   04/15/21 1215  --  74  21  124/59  85 96 % --   04/15/21 1200  --  74  21  116/56  79 95 % --   04/15/21 1100  --  77  25Abnormal   108/59  80 94 % --   04/15/21 1045  --  79  20  107/54  78 93 % --         Pertinent Labs/Diagnostic Test Results:     4/15 CXR:  Right lower lobe groundglass opacity consistent with Covid 19 pneumonia  4/15 ED EKG:  NSR, rate 77   No previous EKG available             Results from last 7 days   Lab Units 04/16/21  0553 04/15/21  1119   WBC Thousand/uL 3 92* 3 16*   HEMOGLOBIN g/dL 12 0 12 7   HEMATOCRIT % 34 6* 37 5   PLATELETS Thousands/uL 121* 124*   NEUTROS ABS Thousands/µL  --  2 20         Results from last 7 days   Lab Units 04/16/21  0553 04/15/21  1119   SODIUM mmol/L 140 138   POTASSIUM mmol/L 3 2* 3 8   CHLORIDE mmol/L 106 105   CO2 mmol/L 22 27   ANION GAP mmol/L 12 6   BUN mg/dL 22 24   CREATININE mg/dL 1 37* 1 54*   EGFR ml/min/1 73sq m 54 47   CALCIUM mg/dL 8 4 8 4     Results from last 7 days   Lab Units 04/16/21  0553 04/15/21  1119   AST U/L 68* 63*   ALT U/L 51 61   ALK PHOS U/L 106 117*   TOTAL PROTEIN g/dL 6 5 6 7   ALBUMIN g/dL 3 1* 3 3*   TOTAL BILIRUBIN mg/dL 0 93 1 29*     Results from last 7 days   Lab Units 04/16/21  0739 04/16/21  0308 04/15/21  2141 04/15/21  1559 04/15/21  1420   POC GLUCOSE mg/dl 115 266* 137 166* 117     Results from last 7 days   Lab Units 04/16/21  0553 04/15/21  1119   GLUCOSE RANDOM mg/dL 98 174*             Results from last 7 days   Lab Units 04/15/21  1119   TROPONIN I ng/mL 0 03     Results from last 7 days   Lab Units 04/15/21  1119   D-DIMER QUANTITATIVE ug/ml FEU 0 39         Results from last 7 days   Lab Units 04/16/21  0553   TSH 3RD GENERATON uIU/mL 4 278*     Results from last 7 days   Lab Units 04/15/21  1240   PROCALCITONIN ng/ml 0 09                 Results from last 7 days   Lab Units 04/15/21  1119   NT-PRO BNP pg/mL 79     Results from last 7 days   Lab Units 04/15/21  1119   FERRITIN ng/mL 665*             Results from last 7 days   Lab Units 04/16/21  0553 04/15/21  1119   CRP mg/L 44 7* 30 9*             Results from last 7 days   Lab Units 04/15/21  1240   BLOOD CULTURE  Received in Microbiology Lab  Culture in Progress  Received in Microbiology Lab  Culture in Progress                 ED Treatment:   Medication Administration from 04/15/2021 1029 to 04/15/2021 1330       Date/Time Order Dose Route Action     04/15/2021 1226 sodium chloride 0 9 % bolus 1,000 mL 1,000 mL Intravenous New Bag     04/15/2021 1241 cefTRIAXone (ROCEPHIN) IVPB (premix in dextrose) 1,000 mg 50 mL 1,000 mg Intravenous New Bag     04/15/2021 1242 doxycycline hyclate (VIBRAMYCIN) capsule 100 mg 100 mg Oral Given        Past Medical History:   Diagnosis Date    [facility]congestive heart failure) ([facility])     [facility]      Present on Admission:  **None**      Admitting Diagnosis: SOB (shortness of breath) [R06 02]  JULISSA (acute kidney injury) ([facility] [address] ) [N17 9]  Pneumonia due to COVID-19 virus [U07 1, J12 82]  Age/Sex: 72 y o  male         Admission Orders:      SCD, Bipap at HS, incentive spirometry  Scheduled Medications:      allopurinol, 300 mg, Oral, Daily  apixaban, 5 mg, Oral, BID  ascorbic acid, 1,000 mg, Oral, Q12H SHANA  atorvastatin, 40 mg, Oral, Daily  cholecalciferol, 2,000 Units, Oral, Daily  famotidine, 20 mg, Oral, BID  insulin glargine, 25 Units, Subcutaneous, Q12H SHANA  insulin lispro, 1-6 Units, Subcutaneous, HS  insulin lispro, 2-12 Units, Subcutaneous, TID AC  levETIRAcetam, 1,000 mg, Oral, Q12H [facility]  levothyroxine, 300 mcg, Oral, Early Morning  metoprolol succinate, 100 mg, Oral, Daily  montelukast, 10 mg, Oral, Daily  zinc sulfate, 220 mg, Oral, Daily    Followed by  Rakan Clarke ON 4/22/2021] multivitamin-minerals, 1 tablet, Oral, Daily  pantoprazole, 40 mg, Oral, Early Morning  remdesivir, 100 mg, Intravenous, Q24H      Continuous IV Infusions:      sodium chloride, 75 mL/hr, Intravenous, Continuous      PRN Meds:      acetaminophen, 650 mg, Oral, Q6H PRN  calcium carbonate, 1,000 mg, Oral, Daily PRN  ondansetron, 4 mg, Intravenous, Q6H PRN          Network Utilization Review Department  ATTENTION: Please call with any questions or concerns to [phone] and carefully listen to the prompts so that you are directed to the right person   All voicemails are confidential   [facility] all requests for admission clinical reviews, approved or denied determinations and any other requests to dedicated fax number below belonging to the campus where the patient is receiving treatment   List of dedicated fax numbers for the Facilities:  [address] [place] [address] DENIALS (Administrative/Medical Necessity) [phone]   [address] (Maternity/NICU/Pediatrics) [phone] [address] Dmitriy Moreno 0684 (  Rad[facility] "Michelle" 103) [address] [address] [place] [id number] [phone]

## 2021-04-16 NOTE — ASSESSMENT & PLAN NOTE
Wt Readings from Last 3 Encounters:   04/16/21 (!) 184 kg (404 lb 12 2 oz)   07/07/18 (!) 179 kg (395 lb)     Currently appears to be hypovolemic    Restart Lasix tomorrow

## 2021-04-16 NOTE — DISCHARGE INSTR - AVS FIRST PAGE
Start losartan after 5 days  Please quarantine at home for at least another 7 days  Please use Tresiba 40 units twice daily for 3-4 days and then go back to your previous dose of 60 units twice daily once your appetite is back to normal   Please monitor your blood sugars twice daily

## 2021-04-16 NOTE — CASE MANAGEMENT
Spoke to MD in am huddle  ( CM not consulted)  Anticipate dc today  Patient is in observation status  No needs identified  Home with family

## 2021-04-16 NOTE — PLAN OF CARE
Problem: PAIN - ADULT  Goal: Verbalizes/displays adequate comfort level or baseline comfort level  Description: Interventions:  - Encourage patient to monitor pain and request assistance  - Assess pain using appropriate pain scale  - Administer analgesics based on type and severity of pain and evaluate response  - Implement non-pharmacological measures as appropriate and evaluate response  - Consider cultural and social influences on pain and pain management  - Notify physician/advanced practitioner if interventions unsuccessful or patient reports new pain  Outcome: Progressing     Problem: INFECTION - ADULT  Goal: Absence or prevention of progression during hospitalization  Description: INTERVENTIONS:  - Assess and monitor for signs and symptoms of infection  - Monitor lab/diagnostic results  - Monitor all insertion sites, i e  indwelling lines, tubes, and drains  - Monitor endotracheal if appropriate and nasal secretions for changes in amount and color  - [place] appropriate cooling/warming therapies per order  - Administer medications as ordered  - Instruct and encourage patient and family to use good hand hygiene technique  - Identify and instruct in appropriate isolation precautions for identified infection/condition  Outcome: Progressing  Goal: Absence of fever/infection during neutropenic period  Description: INTERVENTIONS:  - Monitor WBC    Outcome: Progressing     Problem: SAFETY ADULT  Goal: Patient will remain free of falls  Description: INTERVENTIONS:  - Assess patient frequently for physical needs  -  Identify cognitive and physical deficits and behaviors that affect risk of falls    -  [place] fall precautions as indicated by assessment   - Educate patient/family on patient safety including physical limitations  - Instruct patient to call for assistance with activity based on assessment  - Modify environment to reduce risk of injury  - Consider OT/PT consult to assist with strengthening/mobility  Outcome: Progressing  Goal: Maintain or return to baseline ADL function  Description: INTERVENTIONS:  -  Assess patient's ability to carry out ADLs; assess patient's baseline for ADL function and identify physical deficits which impact ability to perform ADLs (bathing, care of mouth/teeth, toileting, grooming, dressing, etc )  - Assess/evaluate cause of self-care deficits   - Assess range of motion  - Assess patient's mobility; develop plan if impaired  - Assess patient's need for assistive devices and provide as appropriate  - Encourage maximum independence but intervene and supervise when necessary  - Involve family in performance of ADLs  - Assess for home care needs following discharge   - Consider OT consult to assist with ADL evaluation and planning for discharge  - Provide patient education as appropriate  Outcome: Progressing  Goal: Maintain or return mobility status to optimal level  Description: INTERVENTIONS:  - Assess patient's baseline mobility status (ambulation, transfers, stairs, etc )    - Identify cognitive and physical deficits and behaviors that affect mobility  - Identify mobility aids required to assist with transfers and/or ambulation (gait belt, sit-to-stand, lift, walker, cane, etc )  - [place] fall precautions as indicated by assessment  - Record patient progress and toleration of activity level on Mobility SBAR; progress patient to next Phase/Stage  - Instruct patient to call for assistance with activity based on assessment  - Consider rehabilitation consult to assist with strengthening/weightbearing, etc   Outcome: Progressing     Problem: DISCHARGE PLANNING  Goal: Discharge to home or other facility with appropriate resources  Description: INTERVENTIONS:  - Identify barriers to discharge w/patient and caregiver  - Arrange for needed discharge resources and transportation as appropriate  - Identify discharge learning needs (meds, wound care, etc )  - Arrange for interpretive services to assist at discharge as needed  - Refer to Case Management Department for coordinating discharge planning if the patient needs post-hospital services based on physician/advanced practitioner order or complex needs related to functional status, cognitive ability, or social support system  Outcome: Progressing     Problem: Knowledge Deficit  Goal: Patient/family/caregiver demonstrates understanding of disease process, treatment plan, medications, and discharge instructions  Description: Complete learning assessment and assess knowledge base    Interventions:  - Provide teaching at level of understanding  - Provide teaching via preferred learning methods  Outcome: Progressing     Problem: CARDIOVASCULAR - ADULT  Goal: Maintains optimal cardiac output and hemodynamic stability  Description: INTERVENTIONS:  - Monitor I/O, vital signs and rhythm  - Monitor for S/S and trends of decreased cardiac output  - Administer and titrate ordered vasoactive medications to optimize hemodynamic stability  - Assess quality of pulses, skin color and temperature  - Assess for signs of decreased coronary artery perfusion  - Instruct patient to report change in severity of symptoms  Outcome: Progressing  Goal: Absence of cardiac dysrhythmias or at baseline rhythm  Description: INTERVENTIONS:  - Continuous cardiac monitoring, vital signs, obtain 12 lead EKG if ordered  - Administer antiarrhythmic and heart rate control medications as ordered  - Monitor electrolytes and administer replacement therapy as ordered  Outcome: Progressing     Problem: RESPIRATORY - ADULT  Goal: Achieves optimal ventilation and oxygenation  Description: INTERVENTIONS:  - Assess for changes in respiratory status  - Assess for changes in mentation and behavior  - Position to facilitate oxygenation and minimize respiratory effort  - Oxygen administered by appropriate delivery if ordered  - Initiate smoking cessation education as indicated  - Encourage broncho-pulmonary hygiene including cough, deep breathe, Incentive Spirometry  - Assess the need for suctioning and aspirate as needed  - Assess and instruct to report SOB or any respiratory difficulty  - Respiratory Therapy support as indicated  Outcome: Progressing     Problem: SKIN/TISSUE INTEGRITY - ADULT  Goal: Skin integrity remains intact  Description: INTERVENTIONS  - Identify patients at risk for skin breakdown  - Assess and monitor skin integrity  - Assess and monitor nutrition and hydration status  - Monitor labs (i e  albumin)  - Assess for incontinence   - Turn and reposition patient  - Assist with mobility/ambulation  - Relieve pressure over bony prominences  - Avoid friction and shearing  - Provide appropriate hygiene as needed including keeping skin clean and dry  - Evaluate need for skin moisturizer/barrier cream  - Collaborate with interdisciplinary team (i e  Nutrition, Rehabilitation, etc )   - Patient/family teaching  Outcome: Progressing  Goal: Incision(s), wounds(s) or drain site(s) healing without S/S of infection  Description: INTERVENTIONS  - Assess and document risk factors for skin impairment   - Assess and document dressing, incision, wound bed, drain sites and surrounding tissue  - Consider nutrition services referral as needed  - Oral mucous membranes remain intact  - Provide patient/ family education  Outcome: Progressing  Goal: Oral mucous membranes remain intact  Description: INTERVENTIONS  - Assess oral mucosa and hygiene practices  - Implement preventative oral hygiene regimen  - Implement oral medicated treatments as ordered  - Initiate Nutrition services referral as needed  Outcome: Progressing     Problem: MUSCULOSKELETAL - ADULT  Goal: Maintain or return mobility to safest level of function  Description: INTERVENTIONS:  - Assess patient's ability to carry out ADLs; assess patient's baseline for ADL function and identify physical deficits which impact ability to perform ADLs (bathing, care of mouth/teeth, toileting, grooming, dressing, etc )  - Assess/evaluate cause of self-care deficits   - Assess range of motion  - Assess patient's mobility  - Assess patient's need for assistive devices and provide as appropriate  - Encourage maximum independence but intervene and supervise when necessary  - Involve family in performance of ADLs  - Assess for home care needs following discharge   - Consider OT consult to assist with ADL evaluation and planning for discharge  - Provide patient education as appropriate  Outcome: Progressing  Goal: Maintain proper alignment of affected body part  Description: INTERVENTIONS:  - Support, maintain and protect limb and body alignment  - Provide patient/ family with appropriate education  Outcome: Progressing     Problem: Potential for Falls  Goal: Patient will remain free of falls  Description: INTERVENTIONS:  - Assess patient frequently for physical needs  -  Identify cognitive and physical deficits and behaviors that affect risk of falls    -  [place] fall precautions as indicated by assessment   - Educate patient/family on patient safety including physical limitations  - Instruct patient to call for assistance with activity based on assessment  - Modify environment to reduce risk of injury  - Consider OT/PT consult to assist with strengthening/mobility  Outcome: Progressing

## 2021-04-16 NOTE — ASSESSMENT & PLAN NOTE
Patient states that he has been sick for almost 7-10 days  Initially his wife got COVID-19 infection and then he got it  Tested positive on 04/09/2021  Presented to ED because his pulse ox dropped to 50% on ambulation at home  Here his pulse ox is pain around 95-96% on room air but he is very symptomatic due to pneumonia  Chest x-ray shows  pneumonia due to COVID-19  Place on vitamin-C, vitamin-D, zinc and multivitamin    No need for antibiotics as patient does not have a bacterial pneumonia at this time  Will place on remdesevir 1-2 doses  Placed on a short taper of prednisone due to bad cough and congestion and also placed albuterol inhaler along with cough medicine  Continue Eliquis for anticoagulation as per home dose

## 2021-04-16 NOTE — ASSESSMENT & PLAN NOTE
Patient's baseline creatinine is 0 9-1 1  Currently creatinine is up to 1 54 due to dehydration and poor oral intake along with diarrhea due to COVID-19  Placed on IV fluid hydration and repeat creatinine down to 1 3  Hold losartan for 5 days outpatient and restart lasix  Avoid nephrotoxin agents and hypotension    Monitor voiding

## 2021-04-17 LAB — GLUCOSE SERPL-MCNC: 180 MG/DL (ref 65–140)

## 2021-04-18 LAB
ATRIAL RATE: 77 BPM
P AXIS: 45 DEGREES
PR INTERVAL: 204 MS
QRS AXIS: -56 DEGREES
QRSD INTERVAL: 106 MS
QT INTERVAL: 398 MS
QTC INTERVAL: 450 MS
T WAVE AXIS: 47 DEGREES
VENTRICULAR RATE: 77 BPM

## 2021-04-18 PROCEDURE — 93010 ELECTROCARDIOGRAM REPORT: CPT | Performed by: INTERNAL MEDICINE

## 2021-04-20 LAB
BACTERIA BLD CULT: NORMAL
BACTERIA BLD CULT: NORMAL

## 2022-05-10 ENCOUNTER — EVALUATION (OUTPATIENT)
Dept: PHYSICAL THERAPY | Facility: CLINIC | Age: 67
End: 2022-05-10
Payer: COMMERCIAL

## 2022-05-10 DIAGNOSIS — I89.0 LYMPHEDEMA: Primary | ICD-10-CM

## 2022-05-10 PROCEDURE — 97140 MANUAL THERAPY 1/> REGIONS: CPT | Performed by: PHYSICAL THERAPIST

## 2022-05-10 PROCEDURE — 97161 PT EVAL LOW COMPLEX 20 MIN: CPT | Performed by: PHYSICAL THERAPIST

## 2022-05-10 NOTE — PROGRESS NOTES
PT Evaluation     Today's date: 5/10/2022  Patient name: Arik Reina  : 1955  MRN: [id number]  Referring provider: Padmini Tabor MD  Dx:   Encounter Diagnosis     ICD-10-CM    1  Lymphedema  I89 0                   Assessment  Assessment details: Pt presents w/ B LE edema w/ + stemmer's, mild-moderate pitting, and slight skin fibrosis, hemosiderin staining with predominant involvement of B lower legs distal 2/3rds into foot and toes   L>R girth measurements are noted which fits visual inspection of L>R LE size  PMHx significant for CHF, HTN, and DM2, but also cellulitis B LE which could have been an indicator and potential compromise of LE lymphatics   Advise initiating full CDT protocol w/ MLD and compression wraps to reduce swelling BL LE  Other impairment: swelling in BL lower legs, pain    Symptom irritability: lowUnderstanding of Dx/Px/POC: excellent  Goals  ST  Reduce B LE edema >1 cm girth measurement within 2 weeks  2  Pt to obtain compression wraps in 2 weeks  LT  Reduce B LE girth measurements > 2 cm within 5 wks  2  Pt I in self MLD and transition to phase 2 CDT protocol within 5 wks  3  Pt to report 50% less pain in BL lower legs by DC  Plan  Patient would benefit from: skilled physical therapy  Planned therapy interventions: manual therapy, massage, patient education, neuromuscular re-education, breathing training and home exercise program  Other planned therapy interventions: modified MLD  Frequency: 1x week  Duration in weeks: 4  Treatment plan discussed with: patient        Subjective Evaluation    History of Present Illness  Mechanism of injury: Pt notes that he has noticed more shooting pain in BL lower legs the last few months invidiously  Pt notes that he has had lymphedema therapy prior to this and had relief in his legs  He used to wear stockings, but has not been able to wear them since  Pt does have a PMH of CHF and DM2   Pt does notice that his legs will fluctuate in size  Does remember that he has had cellulitis in the past, not sure when  Quality of life: excellent    Pain  Current pain ratin  At best pain ratin  At worst pain ratin  Location: BL lower legs   Quality: sharp and radiating  Aggravating factors: walking and standing    Social Support  Steps to enter house: yes (3 steps)  Stairs in house: no   Lives in: [facility]    Employment status: not working    Diagnostic Tests  No diagnostic tests performed  Treatments  Treatments tried: Lymphedema therapy prior  Patient Goals  Patient goals for therapy: decreased pain, decreased edema and independence with ADLs/IADLs          Objective    LOWER EXTREMITY LEFT CALCULATIONS    Flowsheet Row Most Recent Value   Volume LE (mL) 7192   Difference from last visit (mL)  -7192   Difference from first visit (mL)  -7192      LOWER EXTREMITY RIGHT CALCULATIONS    Flowsheet Row Most Recent Value   Volume LE (mL) 7141   Difference from last visit (mL)  -7141   Difference from first visit (mL)  -7141          Physical assessment: B/L swelling L>R, from hips to toes  Palpation: Mild-moderate heaviness B lower legs, worse on L   Skin Mobility: poor, increased tightness in BL lower legs into ankles/feet  Skin color: hemosiderin staining in BL lower legs  Pittin+  Wound presence: no wounds present  Wound size: n/a  Wound color: n/a  Stemmer's Sign: +     Transfer status: I   Ability to don/doff clothing/garments: I but states that it takes her increased time and is difficult    Toe nail hygiene: fair       Precautions: CHF, DM, HTN      Daily Treatment Diary:      Initial Evaluation Date: 05/10/22  Compliance 5/10                     Visit Number 1                     Re-Eval  IE                 MC   Foto Captured Y                           5/10                     Manual                      Modified MLD Arb 25'            measurements arb            education Arb                      BL lower leg wraps                                            Ther-Ex                                                                                                                                                                                                                                                  Neuro Re-Ed                                                                                                Ther-Act                                                               Modalities

## 2022-05-10 NOTE — LETTER
May 18, 2022    Seble Joe MD  [facility]    Patient: Andreina Santiago   YOB: 1955   Date of Visit: 5/10/2022     Encounter Diagnosis     ICD-10-CM    1  Lymphedema  I89 0        Dear Dr Rocio Romero:    Thank you for your recent referral of Andreina Santiago  Please review the attached evaluation summary from Pranav's recent visit  Please verify that you agree with the plan of care by signing the attached order  If you have any questions or concerns, please do not hesitate to call  I sincerely appreciate the opportunity to share in the care of one of your patients and hope to have another opportunity to work with you in the near future  Sincerely,    Dipti Merrill, PT      Referring Provider:      I certify that I have read the below Plan of Care and certify the need for these services furnished under this plan of treatment while under my care  Seble Joe MD  [place] [id number]  Via Fax: [phone]          PT Evaluation     Today's date: 5/10/2022  Patient name: Andreina Santiago  : 1955  MRN: [id number]  Referring provider: Colton Mcleod MD  Dx:   Encounter Diagnosis     ICD-10-CM    1  Lymphedema  I89 0                   Assessment  Assessment details: Pt presents w/ B LE edema w/ + stemmer's, mild-moderate pitting, and slight skin fibrosis, hemosiderin staining with predominant involvement of B lower legs distal 2/3rds into foot and toes   L>R girth measurements are noted which fits visual inspection of L>R LE size  PMHx significant for CHF, HTN, and DM2, but also cellulitis B LE which could have been an indicator and potential compromise of LE lymphatics   Advise initiating full CDT protocol w/ MLD and compression wraps to reduce swelling BL LE     Other impairment: swelling in BL lower legs, pain    Symptom irritability: lowUnderstanding of Dx/Px/POC: excellent  Goals  ST  Reduce B LE edema >1 cm girth measurement within 2 weeks  2  Pt to obtain compression wraps in 2 weeks  LT  Reduce B LE girth measurements > 2 cm within 5 wks  2  Pt I in self MLD and transition to phase 2 CDT protocol within 5 wks  3  Pt to report 50% less pain in BL lower legs by DC  Plan  Patient would benefit from: skilled physical therapy  Planned therapy interventions: manual therapy, massage, patient education, neuromuscular re-education, breathing training and home exercise program  Other planned therapy interventions: modified MLD  Frequency: 1x week  Duration in weeks: 4  Treatment plan discussed with: patient        Subjective Evaluation    History of Present Illness  Mechanism of injury: Pt notes that he has noticed more shooting pain in BL lower legs the last few months invidiously  Pt notes that he has had lymphedema therapy prior to this and had relief in his legs  He used to wear stockings, but has not been able to wear them since  Pt does have a PMH of CHF and DM2  Pt does notice that his legs will fluctuate in size  Does remember that he has had cellulitis in the past, not sure when  Quality of life: excellent    Pain  Current pain ratin  At best pain ratin  At worst pain ratin  Location: BL lower legs   Quality: sharp and radiating  Aggravating factors: walking and standing    Social Support  Steps to enter house: yes (3 steps)  Stairs in house: no   Lives in: [facility]    Employment status: not working    Diagnostic Tests  No diagnostic tests performed  Treatments  Treatments tried: Lymphedema therapy prior    Patient Goals  Patient goals for therapy: decreased pain, decreased edema and independence with ADLs/IADLs          Objective    LOWER EXTREMITY LEFT CALCULATIONS    Flowsheet Row Most Recent Value   Volume LE (mL) 7192   Difference from last visit (mL)  -7192   Difference from first visit (mL)  -7192      LOWER EXTREMITY RIGHT CALCULATIONS    Flowsheet Row Most Recent Value   Volume LE (mL) 7141   Difference from last visit (mL)  -7141   Difference from first visit (mL)  -7141          Physical assessment: B/L swelling L>R, from hips to toes  Palpation: Mild-moderate heaviness B lower legs, worse on L   Skin Mobility: poor, increased tightness in BL lower legs into ankles/feet  Skin color: hemosiderin staining in BL lower legs  Pittin+  Wound presence: no wounds present  Wound size: n/a  Wound color: n/a  Stemmer's Sign: +     Transfer status: I   Ability to don/doff clothing/garments: I but states that it takes her increased time and is difficult    Toe nail hygiene: fair       Precautions: CHF, DM, HTN      Daily Treatment Diary:      Initial Evaluation Date: 05/10/22  Compliance 5/10                     Visit Number 1                     Re-Eval  IE                 MC   Foto Captured Y                           5/10                     Manual                      Modified MLD Arb 25'            measurements arb            education Arb                      BL lower leg wraps                                            Ther-Ex                                                                                                                                                                                                                                                  Neuro Re-Ed                                                                                                Ther-Act                                                               Modalities

## 2022-05-24 ENCOUNTER — OFFICE VISIT (OUTPATIENT)
Dept: PHYSICAL THERAPY | Facility: CLINIC | Age: 67
End: 2022-05-24
Payer: COMMERCIAL

## 2022-05-24 DIAGNOSIS — I89.0 LYMPHEDEMA: Primary | ICD-10-CM

## 2022-05-24 PROCEDURE — 97140 MANUAL THERAPY 1/> REGIONS: CPT | Performed by: PHYSICAL THERAPIST

## 2022-05-24 NOTE — PROGRESS NOTES
Daily Note     Today's date: 2022  Patient name: Gabe Bradley  : 1955  MRN: [id number]  Referring provider: Jessica Damon MD  Dx:   Encounter Diagnosis     ICD-10-CM    1  Lymphedema  I89 0                   Subjective: Pt notes that he did get wraps, but they are too small and difficult to put on  He was not able to come last week because he was away on vacation  Objective: See treatment diary below      Assessment: Tolerated treatment well  Swelling in BL legs looked similar in size  He continues to have hemosiderin staining in BL lower legs and a shiny appearance to legs  PT educated pt on applying lotion daily to help with the dryness, he verbalized understanding  Pt to order new compression stockings to help with swelling  Patient would benefit from continued PT to address swelling in BL lower legs  Plan: Continue per plan of care         Precautions: CHF, DM, HTN      Daily Treatment Diary:      Initial Evaluation Date: 05/10/22  Compliance 5/10  5/24                   Visit Number 1  2                   Re-Eval  IE -                MC   Foto Captured Y -                          5/10  5/24                   Manual                      Modified MLD Arb 25' Arb 40'           measurements arb -           education Arb   5'                   BL lower leg wraps                                            Ther-Ex                                                                                                                                                                                                                                                  Neuro Re-Ed                                                                                                Ther-Act                                                               Modalities

## 2022-05-31 ENCOUNTER — OFFICE VISIT (OUTPATIENT)
Dept: PHYSICAL THERAPY | Facility: CLINIC | Age: 67
End: 2022-05-31
Payer: COMMERCIAL

## 2022-05-31 DIAGNOSIS — I89.0 LYMPHEDEMA: Primary | ICD-10-CM

## 2022-05-31 PROCEDURE — 97140 MANUAL THERAPY 1/> REGIONS: CPT | Performed by: PHYSICAL THERAPIST

## 2022-06-01 NOTE — PROGRESS NOTES
Daily Note     Today's date: 2022  Patient name: Dulce Fajardo  : 1955  MRN: [id number]  Referring provider: Enio Alexis MD  Dx:   Encounter Diagnosis     ICD-10-CM    1  Lymphedema  I89 0                   Subjective: Pt notes that he brought his compression stockings with him today  Objective: See treatment diary below      Assessment: Tolerated treatment well  Pt tolerated modified MLD without any side effects  Put on compression stockings following MLD, he noted relief in legs following  Pt reports no pain in legs, but at times his legs "give out" on him  Continue to monitor leg symptoms and follow up on tolerance in future sessions  Patient would benefit from continued PT to address swelling in BL LE  Plan: Continue per plan of care        Precautions: CHF, DM, HTN      Daily Treatment Diary:      Initial Evaluation Date: 05/10/22  Compliance 5/10  5/24  5/31                 Visit Number 1  2  3                 Re-Eval  IE -  -              MC   Foto Captured Y -  -                        5/10  5/24  5/31                 Manual                      Modified MLD Arb 22' Arb 40' Arb 35'          measurements arb -           education Arb   5'  5'                 BL lower leg wraps     5'                                       Ther-Ex                                                                                                                                                                                                                                                  Neuro Re-Ed                                                                                                Ther-Act                                                               Modalities

## 2022-06-09 ENCOUNTER — OFFICE VISIT (OUTPATIENT)
Dept: PHYSICAL THERAPY | Facility: CLINIC | Age: 67
End: 2022-06-09
Payer: COMMERCIAL

## 2022-06-09 DIAGNOSIS — I89.0 LYMPHEDEMA: Primary | ICD-10-CM

## 2022-06-09 PROCEDURE — 97140 MANUAL THERAPY 1/> REGIONS: CPT | Performed by: PHYSICAL THERAPIST

## 2022-06-09 NOTE — PROGRESS NOTES
Daily Note     Today's date: 2022  Patient name: Ember Rivero  : 1955  MRN: [id number]  Referring provider: Bismark Esparza MD  Dx:   Encounter Diagnosis     ICD-10-CM    1  Lymphedema  I89 0                   Subjective: Pt notes that he was able to wear his stockings until about 6pm that night  Was able to put them on one other day this week  Stated that he had discomfort in his ankle that made him take it off  Objective: See treatment diary below      Assessment: Tolerated treatment well  Pt noted relief following application of stockings  Dryness continues to be moderate in BL lower legs, he notes that he has not been compliant with applying lotion daily  PT educated pt on the importance of trying to get stockings on daily, pt verbalized understanding  Patient would benefit from continued PT to address swelling in BL LE  Plan: Continue per plan of care        Precautions: CHF, DM, HTN      Daily Treatment Diary:      Initial Evaluation Date: 05/10/22  Compliance 5/10  5/24  5/31  6/9               Visit Number 1  2  3  4               Re-Eval  IE -  -  -            MC   Foto Captured Y -  -  -                      5/10  5/24  5/31  6/9               Manual                      Modified MLD Arb 25' Arb 40' Arb 35' Arb 35'         measurements arb -           education Arb   5'  5'  5'               BL lower leg wraps     5'  5' stockings                                     Ther-Ex                                                                                                                                                                                                                                                  Neuro Re-Ed                                                                                                Ther-Act                                                               Modalities

## 2022-06-16 ENCOUNTER — EVALUATION (OUTPATIENT)
Dept: PHYSICAL THERAPY | Facility: CLINIC | Age: 67
End: 2022-06-16
Payer: COMMERCIAL

## 2022-06-16 DIAGNOSIS — I89.0 LYMPHEDEMA: Primary | ICD-10-CM

## 2022-06-16 PROCEDURE — 97140 MANUAL THERAPY 1/> REGIONS: CPT | Performed by: PHYSICAL THERAPIST

## 2022-06-16 NOTE — PROGRESS NOTES
Re-Evaluation/ DC Summary     Today's date: 2022  Patient name: Andreina Santiago  : 1955  MRN: [id number]  Referring provider: Colton Mcleod MD  Dx:   Encounter Diagnosis     ICD-10-CM    1  Lymphedema  I89 0                       Assessment  Assessment details: Andreina Santiago has been compliant with attending PT and HEP since initial eval  Measurements taken today show minimal improvement in swelling in BL LE  Pt notes that he has been wearing his compression stockings on his legs, but not as consistently as he would like to  Pt continues to have hemosiderine staining in BL LE that has been unchanged  Dryness has improved in BL LE and reports no pain in legs since starting therapy  Pt is comfortable DC'ing from PT at this time and wearing his wraps on his own to help with swelling in his legs  He is comfortable calling the clinic with any future questions or concerns  Other impairment: swelling in BL lower legs, pain    Symptom irritability: lowUnderstanding of Dx/Px/POC: excellent  Goals  ST  Reduce B LE edema >1 cm girth measurement within 2 weeks -in progress- some areas were decreased, but some were similar  2  Pt to obtain compression wraps in 2 weeks  -met    LT  Reduce B LE girth measurements > 2 cm within 5 wks-not met  2  Pt I in self MLD and transition to phase 2 CDT protocol within 5 wks -met  3  Pt to report 50% less pain in BL lower legs by DC  -met    Plan  Patient will continue wearing his wraps daily and DC from PT following today's session  Subjective Evaluation    History of Present Illness  Mechanism of injury: Pt notes that he has been trying to wear his stockings more, but they bother him around his ankles  He notes that OTS stockings are usually not big enough, but is still looking for a stocking that he likes to wear and doesn't hurt  Pt states that his legs feel good following a session, but they continue to feel about the same swelling wise   Pt is comfortable DC'ing from PT at this time and will continue to wear compression on his legs daily to reduce the risk of increased swelling  Quality of life: excellent    Pain  Current pain ratin  At best pain ratin  At worst pain ratin  Location: BL lower legs   Quality: sharp and radiating  Aggravating factors: walking and standing    Social Support  Steps to enter house: yes (3 steps)  Stairs in house: no   Lives in: [facility]    Employment status: not working    Diagnostic Tests  No diagnostic tests performed  Treatments  Treatments tried: Lymphedema therapy prior  Patient Goals  Patient goals for therapy: decreased pain, decreased edema and independence with ADLs/IADLs          Objective  LOWER EXTREMITY LEFT CALCULATIONS    Flowsheet Row Most Recent Value   Volume LE (mL) 7206   Difference from last visit (mL)  -14   Difference from first visit (mL)  -14   Difference from last visit (%)  0   Difference from first visit (%)  0      LOWER EXTREMITY RIGHT CALCULATIONS    Flowsheet Row Most Recent Value   Volume LE (mL) 7343   Difference from last visit (mL)  -202   Difference from first visit (mL)  -202   Difference from last visit (%)  -3   Difference from first visit (%)  -3            Physical assessment: B/L swelling from knees to ankles, minimal swelling in BL feet  Palpation: Mild heaviness B lower legs   Skin Mobility: fair  Skin color: hemosiderin staining in BL lower legs  Pittin+  Wound presence: no wounds present  Wound size: n/a  Wound color: n/a  Stemmer's Sign: +     Transfer status: I   Ability to don/doff clothing/garments: I but states that it takes her increased time and is difficult    Toe nail hygiene: fair         Precautions: CHF, DM, HTN      Daily Treatment Diary:      Initial Evaluation Date: 05/10/22  Compliance 5/10  5/24  5/31  6/9  6/16             Visit Number 1  2  3  4  5             Re-Eval  IE -  -  -  Y          MC   Foto Captured Y -  -  - Y 5/10  5/24  5/31  6/9 6/16             Manual                      Modified MLD Arb 25' Arb 40' Arb 35' Arb 35' Arb 35'        measurements arb -   5'        education Arb   5'  5'  5' 5'             BL lower leg wraps     5'  5' stockings  -                                   Ther-Ex                                                                                                                                                                                                                                                  Neuro Re-Ed                                                                                                Ther-Act                                                               Modalities

## 2022-10-12 PROBLEM — U07.1 PNEUMONIA DUE TO COVID-19 VIRUS: Status: RESOLVED | Noted: 2021-04-15 | Resolved: 2022-10-12

## 2022-10-12 PROBLEM — J12.82 PNEUMONIA DUE TO COVID-19 VIRUS: Status: RESOLVED | Noted: 2021-04-15 | Resolved: 2022-10-12

## 2024-08-17 ENCOUNTER — OFFICE VISIT (OUTPATIENT)
Dept: URGENT CARE | Facility: CLINIC | Age: 69
End: 2024-08-17
Payer: COMMERCIAL

## 2024-08-17 VITALS
RESPIRATION RATE: 20 BRPM | DIASTOLIC BLOOD PRESSURE: 67 MMHG | TEMPERATURE: 97.2 F | WEIGHT: 315 LBS | HEIGHT: 75 IN | HEART RATE: 92 BPM | OXYGEN SATURATION: 98 % | SYSTOLIC BLOOD PRESSURE: 109 MMHG | BODY MASS INDEX: 39.17 KG/M2

## 2024-08-17 DIAGNOSIS — L03.039 PARONYCHIA OF GREAT TOE: Primary | ICD-10-CM

## 2024-08-17 PROCEDURE — S9083 URGENT CARE CENTER GLOBAL: HCPCS | Performed by: PHYSICIAN ASSISTANT

## 2024-08-17 PROCEDURE — 99213 OFFICE O/P EST LOW 20 MIN: CPT | Performed by: PHYSICIAN ASSISTANT

## 2024-08-17 RX ORDER — ZONISAMIDE 100 MG/1
CAPSULE ORAL
COMMUNITY
Start: 2024-03-26

## 2024-08-17 RX ORDER — CEPHALEXIN 500 MG/1
500 CAPSULE ORAL EVERY 8 HOURS SCHEDULED
Qty: 21 CAPSULE | Refills: 0 | Status: SHIPPED | OUTPATIENT
Start: 2024-08-17 | End: 2024-08-24

## 2024-08-17 RX ORDER — INSULIN LISPRO 100 U/ML
INJECTION, SOLUTION SUBCUTANEOUS
COMMUNITY
Start: 2024-07-02

## 2024-08-17 NOTE — PROGRESS NOTES
[facility]        NAME: Pranav Vizcaino is a 68 y.o. male  : 1955    MRN: [id number]  DATE: 2024  TIME: 12:12 PM    Assessment and Plan   Paronychia of great toe [L03.039]  1. Paronychia of great toe  cephalexin (KEFLEX) 500 mg capsule            Patient Instructions   Take antibiotic as prescribed.  Complete full dose of antibiotics even if symptoms begin to improve or resolve.  Over-the-counter Tylenol for pain and fever.  Soak foot in warm soapy water 3-4 times a day.  Observe for signs of worsening infection including increased swelling, redness, pain, discharge, fever or chills, or persistent symptoms.  Your symptoms should begin to improve over the next couple days.    Follow up with PCP in 3-5 days.  Proceed to  ER if symptoms worsen.    If tests have been performed at [facility], our office will contact you with results if changes need to be made to the care plan discussed with you at the visit.  You can review your full results on [facility] MyChart.    Chief Complaint     Chief Complaint   Patient presents with    Foot Pain     R great toe red swollen started on Thursday. No known injury          History of Present Illness       Patient is a 68-year-old male with significant past medical history of hypertension, diabetes, A-fib, and CHF presents the office with his wife complaining of redness, swelling, and pain to right great toenail since yesterday.  History of toe amputation and is concerned he may have another infection.        Review of Systems   Review of Systems   Musculoskeletal:  Positive for arthralgias.   Skin:  Positive for color change.         Current Medications       Current Outpatient Medications:     acetaminophen (TYLENOL) 325 mg tablet, Take 2 tablets (650 mg total) by mouth every 6 (six) hours as needed for mild pain, Disp:  , Rfl: 0    Admelog SoloStar 100 units/mL injection pen, , Disp: , Rfl:     ALLOPURINOL PO, Take 300 mg by mouth daily , Disp: , Rfl:      Atorvastatin Calcium (LIPITOR PO), Take 40 mg by mouth daily , Disp: , Rfl:     cephalexin (KEFLEX) 500 mg capsule, Take 1 capsule (500 mg total) by mouth every 8 (eight) hours for 7 days, Disp: 21 capsule, Rfl: 0    Furosemide (LASIX PO), Take 80 mg by mouth daily , Disp: , Rfl:     insulin degludec (Tresiba FlexTouch) 100 units/mL injection pen, Inject 60 Units under the skin every 12 (twelve) hours, Disp: 15 mL, Rfl: 0    levothyroxine (SYNTHROID) 300 MCG tablet, Take 300 mcg by mouth daily, Disp: , Rfl:     losartan (COZAAR) 50 mg tablet, Take 50 mg by mouth, Disp: , Rfl:     metoprolol succinate (TOPROL-XL) 100 mg 24 hr tablet, TAKE 1.5 TABLETS BY MOUTH EVERY EVENING, Disp: , Rfl:     montelukast (SINGULAIR) 10 mg tablet, Take 10 mg by mouth, Disp: , Rfl:     pantoprazole (PROTONIX) 40 mg tablet, Take 40 mg by mouth, Disp: , Rfl:     Potassium (POTASSIMIN PO), Take by mouth, Disp: , Rfl:     semaglutide, 1 mg/dose, (Ozempic, 1 MG/DOSE,) 4 mg/3 mL injection pen, , Disp: , Rfl:     zonisamide (ZONEGRAN) 100 mg capsule, , Disp: , Rfl:     albuterol (PROVENTIL HFA,VENTOLIN HFA) 90 mcg/act inhaler, Inhale 2 puffs every 4 (four) hours as needed for wheezing (Patient not taking: Reported on 8/17/2024), Disp: 1 Inhaler, Rfl: 0    apixaban (ELIQUIS) 5 mg, Take 5 mg by mouth 2 (two) times a day  (Patient not taking: Reported on 8/17/2024), Disp: , Rfl:     ascorbic acid (VITAMIN C) 1000 MG tablet, Take 1 tablet (1,000 mg total) by mouth daily for 12 doses, Disp: 12 tablet, Rfl: 0    cholecalciferol (VITAMIN D3) 1,000 units tablet, Take 2 tablets (2,000 Units total) by mouth daily for 12 days, Disp: 24 tablet, Rfl: 0    levETIRAcetam (KEPPRA) 500 mg tablet, Take 1,000 mg by mouth every 12 (twelve) hours , Disp: , Rfl:     zinc sulfate (ZINCATE) 220 mg capsule, Take 1 capsule (220 mg total) by mouth daily for 5 doses, Disp: 5 capsule, Rfl: 0    Current Allergies     Allergies as of 08/17/2024 - Reviewed 08/17/2024  "  Allergen Reaction Noted    Aspartame - food allergy Anaphylaxis 04/15/2021    Bactrim [sulfamethoxazole-trimethoprim] Anaphylaxis 07/07/2018    Adhesive  [medical tape] Rash 04/15/2021            The following portions of the patient's history were reviewed and updated as appropriate: allergies, current medications, past family history, past medical history, past social history, past surgical history and problem list.     Past Medical History:   Diagnosis Date    A-fib (HCC)     CHF (congestive heart failure) (HCC)     Diabetes mellitus (HCC)     Gout     High blood pressure        Past Surgical History:   Procedure Laterality Date    BRAIN TUMOR EXCISION      CHOLECYSTECTOMY      KIDNEY STONE SURGERY      KNEE SURGERY         Family History   Problem Relation Age of Onset    Cancer Mother     Cancer Father          Medications have been verified.        Objective   /67   Pulse 92   Temp (!) 97.2 °F (36.2 °C)   Resp 20   Ht 6' 3\" (1.905 m)   Wt (!) 166 kg (365 lb)   SpO2 98%   BMI 45.62 kg/m²   No LMP for male patient.       Physical Exam     Physical Exam  Vitals and nursing note reviewed.   Constitutional:       Appearance: He is well-developed.   HENT:      Head: Normocephalic and atraumatic.      Right Ear: External ear normal.      Left Ear: External ear normal.      Nose: Nose normal.   Eyes:      General: Lids are normal.      Conjunctiva/sclera: Conjunctivae normal.   Skin:     General: Skin is warm and dry.      Capillary Refill: Capillary refill takes less than 2 seconds.      Findings: Erythema (Swelling, erythema, and pain to nailbed of right great toe) present.   Neurological:      Mental Status: He is alert.                   "

## 2024-09-06 ENCOUNTER — APPOINTMENT (EMERGENCY)
Dept: CT IMAGING | Facility: HOSPITAL | Age: 69
DRG: 690 | End: 2024-09-06
Payer: COMMERCIAL

## 2024-09-06 ENCOUNTER — HOSPITAL ENCOUNTER (INPATIENT)
Facility: HOSPITAL | Age: 69
LOS: 4 days | Discharge: HOME | DRG: 690 | End: 2024-09-10
Attending: STUDENT IN AN ORGANIZED HEALTH CARE EDUCATION/TRAINING PROGRAM | Admitting: FAMILY MEDICINE
Payer: COMMERCIAL

## 2024-09-06 ENCOUNTER — OFFICE VISIT (OUTPATIENT)
Dept: URGENT CARE | Facility: CLINIC | Age: 69
End: 2024-09-06
Payer: COMMERCIAL

## 2024-09-06 VITALS
HEIGHT: 75 IN | WEIGHT: 315 LBS | SYSTOLIC BLOOD PRESSURE: 132 MMHG | RESPIRATION RATE: 18 BRPM | DIASTOLIC BLOOD PRESSURE: 67 MMHG | TEMPERATURE: 100.3 F | BODY MASS INDEX: 39.17 KG/M2 | OXYGEN SATURATION: 98 % | HEART RATE: 110 BPM

## 2024-09-06 DIAGNOSIS — N12 PYELONEPHRITIS: Primary | ICD-10-CM

## 2024-09-06 DIAGNOSIS — I50.32 CHRONIC DIASTOLIC CHF (CONGESTIVE HEART FAILURE) (HCC): ICD-10-CM

## 2024-09-06 DIAGNOSIS — R31.29 OTHER MICROSCOPIC HEMATURIA: ICD-10-CM

## 2024-09-06 DIAGNOSIS — R11.2 NAUSEA AND VOMITING, UNSPECIFIED VOMITING TYPE: ICD-10-CM

## 2024-09-06 DIAGNOSIS — E11.9 TYPE 2 DIABETES MELLITUS WITHOUT COMPLICATION, WITH LONG-TERM CURRENT USE OF INSULIN (HCC): ICD-10-CM

## 2024-09-06 DIAGNOSIS — R35.0 FREQUENT URINATION: ICD-10-CM

## 2024-09-06 DIAGNOSIS — K80.20 GALL BLADDER STONES: ICD-10-CM

## 2024-09-06 DIAGNOSIS — Z79.4 TYPE 2 DIABETES MELLITUS WITHOUT COMPLICATION, WITH LONG-TERM CURRENT USE OF INSULIN (HCC): ICD-10-CM

## 2024-09-06 DIAGNOSIS — U07.1 PNEUMONIA DUE TO COVID-19 VIRUS: ICD-10-CM

## 2024-09-06 DIAGNOSIS — R30.0 DYSURIA: ICD-10-CM

## 2024-09-06 DIAGNOSIS — R65.10 SIRS (SYSTEMIC INFLAMMATORY RESPONSE SYNDROME) (HCC): Primary | ICD-10-CM

## 2024-09-06 DIAGNOSIS — J12.82 PNEUMONIA DUE TO COVID-19 VIRUS: ICD-10-CM

## 2024-09-06 DIAGNOSIS — R73.9 HYPERGLYCEMIA: ICD-10-CM

## 2024-09-06 DIAGNOSIS — G40.909 SEIZURE DISORDER (HCC): ICD-10-CM

## 2024-09-06 DIAGNOSIS — E66.01 MORBID OBESITY DUE TO EXCESS CALORIES (HCC): ICD-10-CM

## 2024-09-06 LAB
ALBUMIN SERPL BCG-MCNC: 4 G/DL (ref 3.5–5)
ALP SERPL-CCNC: 103 U/L (ref 34–104)
ALT SERPL W P-5'-P-CCNC: 22 U/L (ref 7–52)
ANION GAP SERPL CALCULATED.3IONS-SCNC: 6 MMOL/L (ref 4–13)
APTT PPP: 34 SECONDS (ref 23–34)
AST SERPL W P-5'-P-CCNC: 16 U/L (ref 13–39)
BACTERIA UR QL AUTO: ABNORMAL /HPF
BASOPHILS # BLD AUTO: 0.04 THOUSANDS/ÂΜL (ref 0–0.1)
BASOPHILS NFR BLD AUTO: 0 % (ref 0–1)
BILIRUB SERPL-MCNC: 1.92 MG/DL (ref 0.2–1)
BILIRUB UR QL STRIP: ABNORMAL
BUN SERPL-MCNC: 15 MG/DL (ref 5–25)
CALCIUM SERPL-MCNC: 9.8 MG/DL (ref 8.4–10.2)
CHLORIDE SERPL-SCNC: 106 MMOL/L (ref 96–108)
CLARITY UR: ABNORMAL
CO2 SERPL-SCNC: 24 MMOL/L (ref 21–32)
COLOR UR: YELLOW
CREAT SERPL-MCNC: 1.19 MG/DL (ref 0.6–1.3)
EOSINOPHIL # BLD AUTO: 0.06 THOUSAND/ÂΜL (ref 0–0.61)
EOSINOPHIL NFR BLD AUTO: 1 % (ref 0–6)
ERYTHROCYTE [DISTWIDTH] IN BLOOD BY AUTOMATED COUNT: 14.1 % (ref 11.6–15.1)
GFR SERPL CREATININE-BSD FRML MDRD: 61 ML/MIN/1.73SQ M
GLUCOSE SERPL-MCNC: 241 MG/DL (ref 65–140)
GLUCOSE SERPL-MCNC: 288 MG/DL (ref 65–140)
GLUCOSE SERPL-MCNC: 295 MG/DL (ref 65–140)
GLUCOSE SERPL-MCNC: 311 MG/DL (ref 65–140)
GLUCOSE UR STRIP-MCNC: ABNORMAL MG/DL
HCT VFR BLD AUTO: 38.5 % (ref 36.5–49.3)
HGB BLD-MCNC: 13.6 G/DL (ref 12–17)
HGB UR QL STRIP.AUTO: ABNORMAL
HYALINE CASTS #/AREA URNS LPF: ABNORMAL /LPF
IMM GRANULOCYTES # BLD AUTO: 0.06 THOUSAND/UL (ref 0–0.2)
IMM GRANULOCYTES NFR BLD AUTO: 1 % (ref 0–2)
INR PPP: 1.34 (ref 0.85–1.19)
KETONES UR STRIP-MCNC: ABNORMAL MG/DL
LACTATE SERPL-SCNC: 1.8 MMOL/L (ref 0.5–2)
LEUKOCYTE ESTERASE UR QL STRIP: ABNORMAL
LYMPHOCYTES # BLD AUTO: 0.84 THOUSANDS/ÂΜL (ref 0.6–4.47)
LYMPHOCYTES NFR BLD AUTO: 7 % (ref 14–44)
MCH RBC QN AUTO: 30.4 PG (ref 26.8–34.3)
MCHC RBC AUTO-ENTMCNC: 35.3 G/DL (ref 31.4–37.4)
MCV RBC AUTO: 86 FL (ref 82–98)
MONOCYTES # BLD AUTO: 0.94 THOUSAND/ÂΜL (ref 0.17–1.22)
MONOCYTES NFR BLD AUTO: 8 % (ref 4–12)
NEUTROPHILS # BLD AUTO: 9.67 THOUSANDS/ÂΜL (ref 1.85–7.62)
NEUTS SEG NFR BLD AUTO: 83 % (ref 43–75)
NITRITE UR QL STRIP: NEGATIVE
NON-SQ EPI CELLS URNS QL MICRO: ABNORMAL /HPF
NRBC BLD AUTO-RTO: 0 /100 WBCS
PH UR STRIP.AUTO: 6 [PH]
PLATELET # BLD AUTO: 147 THOUSANDS/UL (ref 149–390)
PMV BLD AUTO: 10.2 FL (ref 8.9–12.7)
POTASSIUM SERPL-SCNC: 3.9 MMOL/L (ref 3.5–5.3)
PROCALCITONIN SERPL-MCNC: 0.24 NG/ML
PROT SERPL-MCNC: 6.9 G/DL (ref 6.4–8.4)
PROT UR STRIP-MCNC: ABNORMAL MG/DL
PROTHROMBIN TIME: 16.9 SECONDS (ref 12.3–15)
RBC # BLD AUTO: 4.47 MILLION/UL (ref 3.88–5.62)
RBC #/AREA URNS AUTO: ABNORMAL /HPF
SL AMB  POCT GLUCOSE, UA: ABNORMAL
SL AMB LEUKOCYTE ESTERASE,UA: NEGATIVE
SL AMB POCT BILIRUBIN,UA: ABNORMAL
SL AMB POCT BLOOD,UA: ABNORMAL
SL AMB POCT CLARITY,UA: CLEAR
SL AMB POCT COLOR,UA: ABNORMAL
SL AMB POCT KETONES,UA: ABNORMAL
SL AMB POCT NITRITE,UA: NEGATIVE
SL AMB POCT PH,UA: 7.5
SL AMB POCT SPECIFIC GRAVITY,UA: 1.01
SL AMB POCT URINE PROTEIN: NEGATIVE
SL AMB POCT UROBILINOGEN: 2
SODIUM SERPL-SCNC: 136 MMOL/L (ref 135–147)
SP GR UR STRIP.AUTO: 1.02 (ref 1–1.03)
UROBILINOGEN UR QL STRIP.AUTO: 2 E.U./DL
WBC # BLD AUTO: 11.61 THOUSAND/UL (ref 4.31–10.16)
WBC #/AREA URNS AUTO: ABNORMAL /HPF
WBC CLUMPS # UR AUTO: PRESENT /UL

## 2024-09-06 PROCEDURE — 99285 EMERGENCY DEPT VISIT HI MDM: CPT | Performed by: PHYSICIAN ASSISTANT

## 2024-09-06 PROCEDURE — 82948 REAGENT STRIP/BLOOD GLUCOSE: CPT

## 2024-09-06 PROCEDURE — 93005 ELECTROCARDIOGRAM TRACING: CPT

## 2024-09-06 PROCEDURE — 85730 THROMBOPLASTIN TIME PARTIAL: CPT | Performed by: STUDENT IN AN ORGANIZED HEALTH CARE EDUCATION/TRAINING PROGRAM

## 2024-09-06 PROCEDURE — 87086 URINE CULTURE/COLONY COUNT: CPT | Performed by: PHYSICIAN ASSISTANT

## 2024-09-06 PROCEDURE — 87040 BLOOD CULTURE FOR BACTERIA: CPT | Performed by: STUDENT IN AN ORGANIZED HEALTH CARE EDUCATION/TRAINING PROGRAM

## 2024-09-06 PROCEDURE — S9083 URGENT CARE CENTER GLOBAL: HCPCS | Performed by: PHYSICIAN ASSISTANT

## 2024-09-06 PROCEDURE — 85610 PROTHROMBIN TIME: CPT | Performed by: STUDENT IN AN ORGANIZED HEALTH CARE EDUCATION/TRAINING PROGRAM

## 2024-09-06 PROCEDURE — 87086 URINE CULTURE/COLONY COUNT: CPT | Performed by: STUDENT IN AN ORGANIZED HEALTH CARE EDUCATION/TRAINING PROGRAM

## 2024-09-06 PROCEDURE — 36415 COLL VENOUS BLD VENIPUNCTURE: CPT

## 2024-09-06 PROCEDURE — 80053 COMPREHEN METABOLIC PANEL: CPT | Performed by: STUDENT IN AN ORGANIZED HEALTH CARE EDUCATION/TRAINING PROGRAM

## 2024-09-06 PROCEDURE — 81001 URINALYSIS AUTO W/SCOPE: CPT | Performed by: STUDENT IN AN ORGANIZED HEALTH CARE EDUCATION/TRAINING PROGRAM

## 2024-09-06 PROCEDURE — 99285 EMERGENCY DEPT VISIT HI MDM: CPT

## 2024-09-06 PROCEDURE — 82948 REAGENT STRIP/BLOOD GLUCOSE: CPT | Performed by: PHYSICIAN ASSISTANT

## 2024-09-06 PROCEDURE — 96375 TX/PRO/DX INJ NEW DRUG ADDON: CPT

## 2024-09-06 PROCEDURE — 84145 PROCALCITONIN (PCT): CPT | Performed by: STUDENT IN AN ORGANIZED HEALTH CARE EDUCATION/TRAINING PROGRAM

## 2024-09-06 PROCEDURE — 81002 URINALYSIS NONAUTO W/O SCOPE: CPT | Performed by: PHYSICIAN ASSISTANT

## 2024-09-06 PROCEDURE — 83036 HEMOGLOBIN GLYCOSYLATED A1C: CPT | Performed by: FAMILY MEDICINE

## 2024-09-06 PROCEDURE — 83605 ASSAY OF LACTIC ACID: CPT | Performed by: STUDENT IN AN ORGANIZED HEALTH CARE EDUCATION/TRAINING PROGRAM

## 2024-09-06 PROCEDURE — 99214 OFFICE O/P EST MOD 30 MIN: CPT | Performed by: PHYSICIAN ASSISTANT

## 2024-09-06 PROCEDURE — 96365 THER/PROPH/DIAG IV INF INIT: CPT

## 2024-09-06 PROCEDURE — 74177 CT ABD & PELVIS W/CONTRAST: CPT

## 2024-09-06 PROCEDURE — 96361 HYDRATE IV INFUSION ADD-ON: CPT

## 2024-09-06 PROCEDURE — 85025 COMPLETE CBC W/AUTO DIFF WBC: CPT | Performed by: STUDENT IN AN ORGANIZED HEALTH CARE EDUCATION/TRAINING PROGRAM

## 2024-09-06 RX ORDER — MONTELUKAST SODIUM 10 MG/1
10 TABLET ORAL DAILY
Status: DISCONTINUED | OUTPATIENT
Start: 2024-09-07 | End: 2024-09-10 | Stop reason: HOSPADM

## 2024-09-06 RX ORDER — LEVETIRACETAM 500 MG/1
1750 TABLET ORAL EVERY 12 HOURS SCHEDULED
Status: DISCONTINUED | OUTPATIENT
Start: 2024-09-07 | End: 2024-09-10 | Stop reason: HOSPADM

## 2024-09-06 RX ORDER — INSULIN GLARGINE 100 [IU]/ML
39 INJECTION, SOLUTION SUBCUTANEOUS EVERY 12 HOURS SCHEDULED
Status: DISCONTINUED | OUTPATIENT
Start: 2024-09-07 | End: 2024-09-10 | Stop reason: HOSPADM

## 2024-09-06 RX ORDER — METOPROLOL SUCCINATE 100 MG/1
100 TABLET, EXTENDED RELEASE ORAL DAILY
Status: DISCONTINUED | OUTPATIENT
Start: 2024-09-07 | End: 2024-09-10 | Stop reason: HOSPADM

## 2024-09-06 RX ORDER — ASPIRIN 81 MG/1
TABLET ORAL
COMMUNITY

## 2024-09-06 RX ORDER — CEFTRIAXONE 1 G/50ML
1000 INJECTION, SOLUTION INTRAVENOUS ONCE
Status: COMPLETED | OUTPATIENT
Start: 2024-09-06 | End: 2024-09-07

## 2024-09-06 RX ORDER — FUROSEMIDE 80 MG
80 TABLET ORAL DAILY
Status: DISCONTINUED | OUTPATIENT
Start: 2024-09-07 | End: 2024-09-10 | Stop reason: HOSPADM

## 2024-09-06 RX ORDER — ACETAMINOPHEN 10 MG/ML
1000 INJECTION, SOLUTION INTRAVENOUS ONCE
Status: COMPLETED | OUTPATIENT
Start: 2024-09-06 | End: 2024-09-06

## 2024-09-06 RX ORDER — INSULIN LISPRO 100 [IU]/ML
1-5 INJECTION, SOLUTION INTRAVENOUS; SUBCUTANEOUS
Status: DISCONTINUED | OUTPATIENT
Start: 2024-09-07 | End: 2024-09-07

## 2024-09-06 RX ORDER — CEFTRIAXONE 2 G/50ML
2000 INJECTION, SOLUTION INTRAVENOUS EVERY 24 HOURS
Status: DISCONTINUED | OUTPATIENT
Start: 2024-09-07 | End: 2024-09-10 | Stop reason: HOSPADM

## 2024-09-06 RX ORDER — LEVOTHYROXINE SODIUM 150 UG/1
300 TABLET ORAL
Status: DISCONTINUED | OUTPATIENT
Start: 2024-09-07 | End: 2024-09-10 | Stop reason: HOSPADM

## 2024-09-06 RX ORDER — ZONISAMIDE 100 MG/1
200 CAPSULE ORAL DAILY
Status: CANCELLED | OUTPATIENT
Start: 2024-09-07

## 2024-09-06 RX ORDER — CEFTRIAXONE 1 G/50ML
1000 INJECTION, SOLUTION INTRAVENOUS ONCE
Status: COMPLETED | OUTPATIENT
Start: 2024-09-06 | End: 2024-09-06

## 2024-09-06 RX ORDER — PANTOPRAZOLE SODIUM 40 MG/1
40 TABLET, DELAYED RELEASE ORAL
Status: DISCONTINUED | OUTPATIENT
Start: 2024-09-07 | End: 2024-09-10 | Stop reason: HOSPADM

## 2024-09-06 RX ORDER — LEVETIRACETAM 500 MG/1
1000 TABLET ORAL EVERY 12 HOURS SCHEDULED
Status: DISCONTINUED | OUTPATIENT
Start: 2024-09-06 | End: 2024-09-06

## 2024-09-06 RX ORDER — ALLOPURINOL 300 MG/1
300 TABLET ORAL DAILY
Status: DISCONTINUED | OUTPATIENT
Start: 2024-09-07 | End: 2024-09-10 | Stop reason: HOSPADM

## 2024-09-06 RX ORDER — ATORVASTATIN CALCIUM 40 MG/1
40 TABLET, FILM COATED ORAL
Status: DISCONTINUED | OUTPATIENT
Start: 2024-09-07 | End: 2024-09-10 | Stop reason: HOSPADM

## 2024-09-06 RX ORDER — LOSARTAN POTASSIUM 50 MG/1
50 TABLET ORAL DAILY
Status: DISCONTINUED | OUTPATIENT
Start: 2024-09-07 | End: 2024-09-07

## 2024-09-06 RX ADMIN — SODIUM CHLORIDE 1000 ML: 0.9 INJECTION, SOLUTION INTRAVENOUS at 19:07

## 2024-09-06 RX ADMIN — IOHEXOL 100 ML: 350 INJECTION, SOLUTION INTRAVENOUS at 20:39

## 2024-09-06 RX ADMIN — ACETAMINOPHEN 1000 MG: 1000 INJECTION, SOLUTION INTRAVENOUS at 19:15

## 2024-09-06 RX ADMIN — CEFTRIAXONE 1000 MG: 1 INJECTION, SOLUTION INTRAVENOUS at 23:49

## 2024-09-06 RX ADMIN — CEFTRIAXONE 1000 MG: 1 INJECTION, SOLUTION INTRAVENOUS at 20:36

## 2024-09-06 NOTE — PROGRESS NOTES
[facility]        NAME: Pranav Vizcaino is a 69 y.o. male  : 1955    MRN: [id number]  DATE: 2024  TIME: 6:02 PM    Assessment and Plan   SIRS (systemic inflammatory response syndrome) (HCC) [R65.10]  1. SIRS (systemic inflammatory response syndrome) (HCC)        2. Dysuria        3. Frequent urination  POCT urine dip    Urine culture      4. Other microscopic hematuria        5. Hyperglycemia        6. Nausea and vomiting, unspecified vomiting type          Note written by REYES Barrera. Reviewed by Ashish Richards PA-C    Patient Instructions   Go to ER for further evaluation.    Follow up with PCP in 3-5 days.  Proceed to  ER if symptoms worsen.    If tests have been performed at [facility], our office will contact you with results if changes need to be made to the care plan discussed with you at the visit.  [facility]    Chief Complaint     Chief Complaint   Patient presents with    Possible UTI     Diarrhea 2 days ago. Vomited 3 days ago. Very frequent urination started about 3 days ago. Pink tint in urine this morning.     Checks glucose daily, states it was 300 today. It is usually in the 100's.          History of Present Illness       Pranav is a 69 year old male with a PMH significant for A fib, DM, CHF, and HTN, and a remote history of resolved stoma, suprapubic catheter, and urinary tract procedures, who presents with a 2 day history of dysuria and frequency. On Tuesday he had episodes of vomiting and diarrhea, which he treated with imodium and has since resolved. He endorses left sided back pain, hesitancy, interrupted stream, frequency, dysuria, and urgency. He noted pink tinged urine this morning. He denies previous fevers, chills, HA, SOB, chest pain, and penile discharge. His blood sugars are normally in the 150s-160s and he reports elevated BGM approximately in the 280s today. He has an anaphylactic reaction to  Bactrim per chart review.        Review of Systems   Review of Systems   Constitutional:  Positive for fever. Negative for chills.   Respiratory:  Negative for chest tightness and shortness of breath.    Cardiovascular:  Negative for chest pain and palpitations.   Gastrointestinal:  Positive for diarrhea, nausea and vomiting. Negative for abdominal pain.   Genitourinary:  Positive for difficulty urinating, dysuria, frequency, hematuria and urgency. Negative for penile discharge.        Interruptions in stream   Musculoskeletal:  Positive for back pain.        L lumbar region   Neurological:  Negative for headaches.         Current Medications       Current Outpatient Medications:     acetaminophen (TYLENOL) 325 mg tablet, Take 2 tablets (650 mg total) by mouth every 6 (six) hours as needed for mild pain, Disp:  , Rfl: 0    Admelog SoloStar 100 units/mL injection pen, 26 Units 3 (three) times a day with meals, Disp: , Rfl:     ALLOPURINOL PO, Take 300 mg by mouth daily , Disp: , Rfl:     apixaban (ELIQUIS) 5 mg, Take 5 mg by mouth 2 (two) times a day, Disp: , Rfl:     ascorbic acid (VITAMIN C) 1000 MG tablet, Take 1 tablet (1,000 mg total) by mouth daily for 12 doses, Disp: 12 tablet, Rfl: 0    aspirin (ECOTRIN LOW STRENGTH) 81 mg EC tablet, Take by mouth, Disp: , Rfl:     Atorvastatin Calcium (LIPITOR PO), Take 40 mg by mouth daily , Disp: , Rfl:     cholecalciferol (VITAMIN D3) 1,000 units tablet, Take 2 tablets (2,000 Units total) by mouth daily for 12 days, Disp: 24 tablet, Rfl: 0    Furosemide (LASIX PO), Take 80 mg by mouth daily , Disp: , Rfl:     insulin degludec (Tresiba FlexTouch) 100 units/mL injection pen, Inject 60 Units under the skin every 12 (twelve) hours, Disp: 15 mL, Rfl: 0    levETIRAcetam (KEPPRA) 500 mg tablet, Take 1,000 mg by mouth every 12 (twelve) hours , Disp: , Rfl:     levothyroxine (SYNTHROID) 300 MCG tablet, Take 300 mcg by mouth daily, Disp: , Rfl:     losartan (COZAAR) 50 mg tablet,  "Take 50 mg by mouth, Disp: , Rfl:     metoprolol succinate (TOPROL-XL) 100 mg 24 hr tablet, TAKE 1.5 TABLETS BY MOUTH EVERY EVENING, Disp: , Rfl:     montelukast (SINGULAIR) 10 mg tablet, Take 10 mg by mouth, Disp: , Rfl:     pantoprazole (PROTONIX) 40 mg tablet, Take 40 mg by mouth, Disp: , Rfl:     Potassium (POTASSIMIN PO), Take by mouth, Disp: , Rfl:     semaglutide, 1 mg/dose, (Ozempic, 1 MG/DOSE,) 4 mg/3 mL injection pen, 2 mg, Disp: , Rfl:     zonisamide (ZONEGRAN) 100 mg capsule, , Disp: , Rfl:     albuterol (PROVENTIL HFA,VENTOLIN HFA) 90 mcg/act inhaler, Inhale 2 puffs every 4 (four) hours as needed for wheezing (Patient not taking: Reported on 8/17/2024), Disp: 1 Inhaler, Rfl: 0    zinc sulfate (ZINCATE) 220 mg capsule, Take 1 capsule (220 mg total) by mouth daily for 5 doses (Patient not taking: Reported on 9/6/2024), Disp: 5 capsule, Rfl: 0    Current Allergies     Allergies as of 09/06/2024 - Reviewed 09/06/2024   Allergen Reaction Noted    Aspartame - food allergy Anaphylaxis 04/15/2021    Bactrim [sulfamethoxazole-trimethoprim] Anaphylaxis 07/07/2018    Trimethoprim Other (See Comments) 09/06/2024    Adhesive  [medical tape] Rash 04/15/2021            The following portions of the patient's history were reviewed and updated as appropriate: allergies, current medications, past family history, past medical history, past social history, past surgical history and problem list.     Past Medical History:   Diagnosis Date    A-fib (HCC)     CHF (congestive heart failure) (HCC)     Diabetes mellitus (HCC)     Gout     High blood pressure        Past Surgical History:   Procedure Laterality Date    BRAIN TUMOR EXCISION      CHOLECYSTECTOMY      KIDNEY STONE SURGERY      KNEE SURGERY         Family History   Problem Relation Age of Onset    Cancer Mother     Cancer Father          Medications have been verified.        Objective   /67   Pulse (!) 110   Temp 100.3 °F (37.9 °C)   Resp 18   Ht 6' 3\" " (1.905 m)   Wt (!) 158 kg (349 lb)   SpO2 98%   BMI 43.62 kg/m²   No LMP for male patient.       Physical Exam     Physical Exam  Vitals and nursing note reviewed.   Constitutional:       Appearance: Normal appearance. He is well-developed.   HENT:      Head: Normocephalic and atraumatic.      Right Ear: Tympanic membrane, ear canal and external ear normal.      Left Ear: Tympanic membrane, ear canal and external ear normal.      Nose: Nose normal.      Mouth/Throat:      Pharynx: Uvula midline.   Eyes:      General: Lids are normal.      Conjunctiva/sclera: Conjunctivae normal.      Pupils: Pupils are equal, round, and reactive to light.   Cardiovascular:      Rate and Rhythm: Regular rhythm. Tachycardia present.      Heart sounds: Normal heart sounds. No murmur heard.     No friction rub. No gallop.   Pulmonary:      Effort: Pulmonary effort is normal.      Breath sounds: Normal breath sounds. No stridor. No wheezing or rales.   Abdominal:      General: Bowel sounds are normal.      Palpations: Abdomen is soft.      Tenderness: There is abdominal tenderness (RUQ to deep palpation). There is no right CVA tenderness or left CVA tenderness.   Musculoskeletal:         General: Normal range of motion.      Cervical back: Neck supple.   Skin:     General: Skin is warm and dry.      Capillary Refill: Capillary refill takes less than 2 seconds.      Findings: No rash.   Neurological:      Mental Status: He is alert.   Psychiatric:         Mood and Affect: Mood normal.         Behavior: Behavior normal.           POC rapid COVID-19: negative    POC glucose 288    Urine dip:        Component 9/6/24 1659   LEUKOCYTE ESTERASE,UA negative   NITRITE,UA negative   SL AMB POCT UROBILINOGEN 2   POCT URINE PROTEIN negative    PH,UA 7.5   BLOOD,UA large   SPECIFIC GRAVITY,UA 1.010   KETONES,UA trace   BILIRUBIN,UA small   GLUCOSE, UA 2,000+    COLOR,UA cliff   CLARITY,UA clear

## 2024-09-06 NOTE — ED PROVIDER NOTES
History  Chief Complaint   Patient presents with    Possible UTI     Pt went to urgent care today for an increase in urinary frequency. Pt was told he has a UTI but urgent care was concerned for sepsis     69 year old male presents to the ED with wife for evaluation of UTI and concern for sepsis.  Past medical history of A fib, DM, CHF, and HTN, and a remote history of resolved stoma, suprapubic catheter, and urinary tract procedures.additional past surgical history of cholecystectomy.  States yesterday, he felt fine. Notes earlier in the week on Tuesday he had one episode of vomiting with loose stools (wife states she was in a similar position so they didn't think much of this.) States last night he started with frequent urinartion. States that persistened today which is what prompted an urgent care and he was told he had a UTI. States he was lso told her had a fever at that time and was instructed to come to the ED for further evaluation and concern for possible sepsis. Reports he had a history of sepsis due to an infection on the leg. Patient does report a histroy of kideny stones but states this feels different. Notes occasionall left flank pain.  Patient also states he occasionally gets pain in the right lower abdomen, states he previously had a stoma and this area and occasionally still gets discomfort here.  Patient denies any abnormal bowel movements here today.  Denies nausea or vomiting.  Denies chest pain palpitations or shortness of breath.          Prior to Admission Medications   Prescriptions Last Dose Informant Patient Reported? Taking?   ALLOPURINOL PO 2024 Self Yes Yes   Sig: Take 300 mg by mouth daily    Admelog SoloStar 100 units/mL injection pen 2024  Yes Yes   Si Units 3 (three) times a day with meals   Atorvastatin Calcium (LIPITOR PO) 2024  Yes Yes   Sig: Take 40 mg by mouth daily    Furosemide (LASIX PO) 2024  Yes Yes   Sig: Take 80 mg by mouth daily    Potassium  (POTASSIMIN PO) 2024  Yes Yes   Sig: Take by mouth   acetaminophen (TYLENOL) 325 mg tablet   No No   Sig: Take 2 tablets (650 mg total) by mouth every 6 (six) hours as needed for mild pain   albuterol (PROVENTIL HFA,VENTOLIN HFA) 90 mcg/act inhaler   No No   Sig: Inhale 2 puffs every 4 (four) hours as needed for wheezing   Patient not taking: Reported on 2024   apixaban (ELIQUIS) 5 mg 2024  Yes Yes   Sig: Take 5 mg by mouth 2 (two) times a day   ascorbic acid (VITAMIN C) 1000 MG tablet 2024  No Yes   Sig: Take 1 tablet (1,000 mg total) by mouth daily for 12 doses   aspirin (ECOTRIN LOW STRENGTH) 81 mg EC tablet 2024  Yes Yes   Sig: Take by mouth   cholecalciferol (VITAMIN D3) 1,000 units tablet 2024  No Yes   Sig: Take 2 tablets (2,000 Units total) by mouth daily for 12 days   insulin degludec (Tresiba FlexTouch) 100 units/mL injection pen 2024  No Yes   Sig: Inject 60 Units under the skin every 12 (twelve) hours   Patient taking differently: Inject 39 Units under the skin every 12 (twelve) hours   levETIRAcetam (KEPPRA) 500 mg tablet 2024  Yes Yes   Sig: Take 1,000 mg by mouth every 12 (twelve) hours    levothyroxine (SYNTHROID) 300 MCG tablet 2024  Yes Yes   Sig: Take 300 mcg by mouth daily   losartan (COZAAR) 50 mg tablet 2024  Yes Yes   Sig: Take 50 mg by mouth   metoprolol succinate (TOPROL-XL) 100 mg 24 hr tablet 2024  Yes Yes   Sig: TAKE 1.5 TABLETS BY MOUTH EVERY EVENING   montelukast (SINGULAIR) 10 mg tablet 2024  Yes Yes   Sig: Take 10 mg by mouth   pantoprazole (PROTONIX) 40 mg tablet 2024  Yes Yes   Sig: Take 40 mg by mouth   semaglutide, 1 mg/dose, (Ozempic, 1 MG/DOSE,) 4 mg/3 mL injection pen 2024  Yes Yes   Si mg   zinc sulfate (ZINCATE) 220 mg capsule   No No   Sig: Take 1 capsule (220 mg total) by mouth daily for 5 doses   Patient not taking: Reported on 2024   zonisamide (ZONEGRAN) 100 mg capsule 2024  Yes Yes   Sig: Take 200 mg  by mouth daily      Facility-Administered Medications: None       Past Medical History:   Diagnosis Date    A-fib (HCC)     CHF (congestive heart failure) (HCC)     Diabetes mellitus (HCC)     Gout     High blood pressure        Past Surgical History:   Procedure Laterality Date    BRAIN TUMOR EXCISION      CHOLECYSTECTOMY      KIDNEY STONE SURGERY      KNEE SURGERY         Family History   Problem Relation Age of Onset    Cancer Mother     Cancer Father      I have reviewed and agree with the history as documented.    E-Cigarette/Vaping    E-Cigarette Use Never User      E-Cigarette/Vaping Substances     Social History     Tobacco Use    Smoking status: Never    Smokeless tobacco: Never   Vaping Use    Vaping status: Never Used   Substance Use Topics    Alcohol use: Yes     Comment: rarely    Drug use: No       Review of Systems   Constitutional:  Positive for fever.   HENT: Negative.     Respiratory: Negative.     Cardiovascular: Negative.    Gastrointestinal:  Positive for abdominal pain. Negative for diarrhea, nausea and vomiting.   Genitourinary:  Positive for flank pain.   Skin: Negative.    Neurological: Negative.    All other systems reviewed and are negative.      Physical Exam  Physical Exam  Vitals and nursing note reviewed.   Constitutional:       General: He is not in acute distress.     Appearance: Normal appearance. He is not ill-appearing, toxic-appearing or diaphoretic.   HENT:      Head: Normocephalic.      Nose: Nose normal.   Eyes:      Conjunctiva/sclera: Conjunctivae normal.   Cardiovascular:      Rate and Rhythm: Regular rhythm. Tachycardia present.   Pulmonary:      Effort: Pulmonary effort is normal.      Breath sounds: Normal breath sounds. No stridor. No wheezing, rhonchi or rales.   Chest:      Chest wall: No tenderness.   Abdominal:      General: Bowel sounds are normal. There is no distension.      Palpations: Abdomen is soft.      Tenderness: There is no abdominal tenderness. There is  left CVA tenderness.   Musculoskeletal:         General: Normal range of motion.   Skin:     General: Skin is warm and dry.      Findings: No bruising, erythema or rash.   Neurological:      General: No focal deficit present.      Mental Status: He is alert and oriented to person, place, and time.   Psychiatric:         Mood and Affect: Mood normal.         Vital Signs  ED Triage Vitals   Temperature Pulse Respirations Blood Pressure SpO2   09/06/24 1831 09/06/24 1831 09/06/24 1831 09/06/24 1831 09/06/24 1831   100 °F (37.8 °C) (!) 115 18 162/80 97 %      Temp Source Heart Rate Source Patient Position - Orthostatic VS BP Location FiO2 (%)   09/06/24 1831 09/06/24 1831 -- 09/06/24 1831 --   Temporal Monitor  Left arm       Pain Score       09/06/24 1854       3           Vitals:    09/06/24 1831 09/06/24 1900 09/06/24 1915 09/06/24 2030   BP: 162/80 144/71  140/65   Pulse: (!) 115 (!) 108 104 95         Visual Acuity      ED Medications  Medications   sodium chloride 0.9 % bolus 1,000 mL (0 mL Intravenous Stopped 9/6/24 2007)   acetaminophen (Ofirmev) injection 1,000 mg (0 mg Intravenous Stopped 9/6/24 1930)   cefTRIAXone (ROCEPHIN) IVPB (premix in dextrose) 1,000 mg 50 mL (1,000 mg Intravenous New Bag 9/6/24 2036)   iohexol (OMNIPAQUE) 350 MG/ML injection (MULTI-DOSE) 100 mL (100 mL Intravenous Given 9/6/24 2039)       Diagnostic Studies  Results Reviewed       Procedure Component Value Units Date/Time    Procalcitonin [[id number]]  (Normal) Collected: 09/06/24 1859    Lab Status: Final result Specimen: Blood from Arm, Right Updated: 09/06/24 1953     Procalcitonin 0.24 ng/ml     Urine Microscopic [[id number]]  (Abnormal) Collected: 09/06/24 1850    Lab Status: Final result Specimen: Urine, Clean Catch Updated: 09/06/24 1949     RBC, UA 30-50 /hpf      WBC, UA 30-50 /hpf      Epithelial Cells Occasional /hpf      Bacteria, UA Occasional /hpf      Hyaline Casts, UA 0-1 /lpf      WBC Clumps Present    Urine culture  [[id number]] Collected: 09/06/24 1850    Lab Status: In process Specimen: Urine, Clean Catch Updated: 09/06/24 1949    Comprehensive metabolic panel [[id number]]  (Abnormal) Collected: 09/06/24 1859    Lab Status: Final result Specimen: Blood from Arm, Right Updated: 09/06/24 1931     Sodium 136 mmol/L      Potassium 3.9 mmol/L      Chloride 106 mmol/L      CO2 24 mmol/L      ANION GAP 6 mmol/L      BUN 15 mg/dL      Creatinine 1.19 mg/dL      Glucose 295 mg/dL      Calcium 9.8 mg/dL      AST 16 U/L      ALT 22 U/L      Alkaline Phosphatase 103 U/L      Total Protein 6.9 g/dL      Albumin 4.0 g/dL      Total Bilirubin 1.92 mg/dL      eGFR 61 ml/min/1.73sq m     Narrative:      National Kid[employer] guidelines for Chronic Kidney Disease (CKD):     Stage 1 with normal or high GFR (GFR > 90 mL/min/1.73 square meters)    Stage 2 Mild CKD (GFR = 60-89 mL/min/1.73 square meters)    Stage 3A Moderate CKD (GFR = 45-59 mL/min/1.73 square meters)    Stage 3B Moderate CKD (GFR = 30-44 mL/min/1.73 square meters)    Stage 4 Severe CKD (GFR = 15-29 mL/min/1.73 square meters)    Stage 5 End Stage CKD (GFR <15 mL/min/1.73 square meters)  Note: GFR calculation is accurate only with a steady state creatinine    APTT [[id number]]  (Normal) Collected: 09/06/24 1859    Lab Status: Final result Specimen: Blood from Arm, Right Updated: 09/06/24 1927     PTT 34 seconds     Protime-INR [[id number]]  (Abnormal) Collected: 09/06/24 1859    Lab Status: Final result Specimen: Blood from Arm, Right Updated: 09/06/24 1927     Protime 16.9 seconds      INR 1.34    Narrative:      INR Therapeutic Range    Indication                                             INR Range      Atrial Fibrillation                                               2.0-3.0  Hypercoagulable State                                    2.0.2.3  Left Ventricular Asist Device                            2.0-3.0  Mechanical Heart Valve                                  -     Aortic(with afib, MI, embolism, HF, LA enlargement,    and/or coagulopathy)                                     2.0-3.0 (2.5-3.5)     Mitral                                                             2.5-3.5  Prosthetic/Bioprosthetic Heart Valve               2.0-3.0  Venous thromboembolism (VTE: VT, PE        2.0-3.0    Lactic acid, plasma (w/reflex if result > 2.0) [[id number]]  (Normal) Collected: 09/06/24 1859    Lab Status: Final result Specimen: Blood from Arm, Right Updated: 09/06/24 1926     LACTIC ACID 1.8 mmol/L     Narrative:      Result may be elevated if tourniquet was used during collection.    UA w Reflex to Microscopic w Reflex to Culture [[id number]]  (Abnormal) Collected: 09/06/24 1850    Lab Status: Final result Specimen: Urine, Clean Catch Updated: 09/06/24 1918     Color, UA Yellow     Clarity, UA Slightly Cloudy     Specific Gravity, UA 1.020     pH, UA 6.0     Leukocytes, UA Trace     Nitrite, UA Negative     Protein, UA Trace mg/dl      Glucose, UA >=1000 (1%) mg/dl      Ketones, UA Trace mg/dl      Urobilinogen, UA 2.0 E.U./dl      Bilirubin, UA Small     Occult Blood, UA Large    CBC and differential [[id number]]  (Abnormal) Collected: 09/06/24 1859    Lab Status: Final result Specimen: Blood from Arm, Right Updated: 09/06/24 1905     WBC 11.61 Thousand/uL      RBC 4.47 Million/uL      Hemoglobin 13.6 g/dL      Hematocrit 38.5 %      MCV 86 fL      MCH 30.4 pg      MCHC 35.3 g/dL      RDW 14.1 %      MPV 10.2 fL      Platelets 147 Thousands/uL      nRBC 0 /100 WBCs      Segmented % 83 %      Immature Grans % 1 %      Lymphocytes % 7 %      Monocytes % 8 %      Eosinophils Relative 1 %      Basophils Relative 0 %      Absolute Neutrophils 9.67 Thousands/µL      Absolute Immature Grans 0.06 Thousand/uL      Absolute Lymphocytes 0.84 Thousands/µL      Absolute Monocytes 0.94 Thousand/µL      Eosinophils Absolute 0.06 Thousand/µL      Basophils Absolute 0.04 Thousands/µL     Blood culture #1  [[id number]] Collected: 09/06/24 1859    Lab Status: In process Specimen: Blood from Arm, Right Updated: 09/06/24 1903    Fingerstick Glucose (POCT) [[id number]]  (Abnormal) Collected: 09/06/24 1844    Lab Status: Final result Specimen: Blood Updated: 09/06/24 1845     POC Glucose 311 mg/dl                    CT abdomen pelvis with contrast   Final Result by Eliel Hollis MD (09/06 2102)      Splenomegaly with lobular hepatic contour could be related to cirrhosis.      Left greater than right perinephric stranding could be related to pyelonephritis.      Stones in the right upper quadrant may be within a cystic duct remnant versus contracted gallbladder. The common duct is borderline at 7 mm. Correlation with surgical history is advised and LFTs. Nonemergent MRCP follow-up may be useful.      Right lower quadrant ventral hernia is identified without evidence of obstruction at this time.      This was discussed with Catarina GONZALEZ at 9:00 p.m.         Workstation performed: [id number]                    Procedures  ECG 12 Lead Documentation Only    Date/Time: 9/6/2024 7:18 PM    Performed by: Sandy Cuellar PA-C  Authorized by: Sandy Cuellar PA-C    Patient location:  ED  Interpretation:     Interpretation: non-specific    Rate:     ECG rate:  105    ECG rate assessment: normal    Rhythm:     Rhythm: sinus rhythm    Ectopy:     Ectopy: none    QRS:     QRS axis:  Normal    QRS intervals:  Normal  Conduction:     Conduction: abnormal             ED Course  ED Course as of 09/06/24 2146   Fri Sep 06, 2024   1912 WBC(!): 11.61   1932 LACTIC ACID: 1.8   2010 UA concerning for UTI will start on IV rocephin    2024 Patient updated on results and findings thus far.  Resting comfortably at this time.  Heading to CT scan   2109 CT abd: Splenomegaly with lobular hepatic contour could be related to cirrhosis.     Left greater than right perinephric stranding could be related to pyelonephritis.     Stones in the right upper  quadrant may be within a cystic duct remnant versus contracted gallbladder. The common duct is borderline at 7 mm. Correlation with surgical history is advised and LFTs. Nonemergent MRCP follow-up may be useful.     Right lower quadrant ventral hernia is identified without evidence of obstruction at this time.     2114 I discussed results and findings with the patient we discussed recommendation for admission due to suspecting pyelo with fever and tachycardia leukocytosis.  Patient was agreeable this treatment plan.  Message sent to medicine requesting admission                                 SBIRT 22yo+      Flowsheet Row Most Recent Value   Initial Alcohol Screen: US AUDIT-C     1. How often do you have a drink containing alcohol? 0 Filed at: 09/06/2024 1923   2. How many drinks containing alcohol do you have on a typical day you are drinking?  0 Filed at: 09/06/2024 1923   3a. Male UNDER 65: How often do you have five or more drinks on one occasion? 0 Filed at: 09/06/2024 1923   3b. FEMALE Any Age, or MALE 65+: How often do you have 4 or more drinks on one occassion? 0 Filed at: 09/06/2024 1923   Audit-C Score 0 Filed at: 09/06/2024 1923   KAT: How many times in the past year have you...    Used an illegal drug or used a prescription medication for non-medical reasons? Never Filed at: 09/06/2024 1923                      Medical Decision Making  69-year-old male presented to the emergency department for evaluation of urinary frequency, left flank pain, fevers.  Vitals and medical record reviewed.  Patient at risk for the following but not limited to urinary tract infection, pyelonephritis, ureteral stone, sepsis.  Patient with low-grade fever and tachycardia on arrival.  Mild leukocytosis.  No lactic acidosis.  UA was concerning for urinary tract infection with CAT scan consistent with pyelonephritis.  No identified ureteral stones at this time.  Patient was treated with IV antibiotics and fluids.  Additionally  with IV Tylenol.  Patient was agreeable to admission and he was accepted by the medicine service.    Problems Addressed:  Pyelonephritis: acute illness or injury    Amount and/or Complexity of Data Reviewed  Independent Historian: spouse  Labs: ordered. Decision-making details documented in ED Course.  Radiology: ordered.    Risk  Prescription drug management.  Decision regarding hospitalization.                 Disposition  Final diagnoses:   Pyelonephritis     Time reflects when diagnosis was documented in both MDM as applicable and the Disposition within this note       Time User Action Codes Description Comment    9/6/2024  9:18 PM Sandy Cuellar Add [N12] Pyelonephritis           ED Disposition       ED Disposition   Admit    Condition   Stable    Date/Time   Fri Sep 6, 2024 2118    Comment   Case was discussed with Dr. Shaw and the patient's admission status was agreed to be Admission Status: inpatient status to the service of Dr. Shaw .               Follow-up Information    None         Patient's Medications   Discharge Prescriptions    No medications on file       No discharge procedures on file.    PDMP Review       None            ED Provider  Electronically Signed by             Sandy Cuellar PA-C  09/06/24 0005

## 2024-09-07 PROBLEM — Z86.79 HISTORY OF ATRIAL FIBRILLATION: Status: ACTIVE | Noted: 2024-09-07

## 2024-09-07 LAB
BACTERIA UR CULT: NORMAL
EST. AVERAGE GLUCOSE BLD GHB EST-MCNC: 160 MG/DL
EST. AVERAGE GLUCOSE BLD GHB EST-MCNC: 166 MG/DL
GLUCOSE SERPL-MCNC: 260 MG/DL (ref 65–140)
GLUCOSE SERPL-MCNC: 281 MG/DL (ref 65–140)
GLUCOSE SERPL-MCNC: 299 MG/DL (ref 65–140)
GLUCOSE SERPL-MCNC: 338 MG/DL (ref 65–140)
HBA1C MFR BLD: 7.2 %
HBA1C MFR BLD: 7.4 %

## 2024-09-07 PROCEDURE — 83036 HEMOGLOBIN GLYCOSYLATED A1C: CPT

## 2024-09-07 PROCEDURE — 82948 REAGENT STRIP/BLOOD GLUCOSE: CPT

## 2024-09-07 PROCEDURE — 97535 SELF CARE MNGMENT TRAINING: CPT

## 2024-09-07 PROCEDURE — 99232 SBSQ HOSP IP/OBS MODERATE 35: CPT | Performed by: FAMILY MEDICINE

## 2024-09-07 PROCEDURE — 97167 OT EVAL HIGH COMPLEX 60 MIN: CPT

## 2024-09-07 RX ORDER — INSULIN LISPRO 100 [IU]/ML
2-12 INJECTION, SOLUTION INTRAVENOUS; SUBCUTANEOUS
Status: DISCONTINUED | OUTPATIENT
Start: 2024-09-07 | End: 2024-09-10 | Stop reason: HOSPADM

## 2024-09-07 RX ORDER — INSULIN LISPRO 100 [IU]/ML
1-6 INJECTION, SOLUTION INTRAVENOUS; SUBCUTANEOUS
Status: DISCONTINUED | OUTPATIENT
Start: 2024-09-07 | End: 2024-09-10 | Stop reason: HOSPADM

## 2024-09-07 RX ORDER — PHENAZOPYRIDINE HYDROCHLORIDE 100 MG/1
100 TABLET, FILM COATED ORAL
Status: DISCONTINUED | OUTPATIENT
Start: 2024-09-07 | End: 2024-09-10 | Stop reason: HOSPADM

## 2024-09-07 RX ADMIN — INSULIN LISPRO 6 UNITS: 100 INJECTION, SOLUTION INTRAVENOUS; SUBCUTANEOUS at 17:07

## 2024-09-07 RX ADMIN — INSULIN LISPRO 2 UNITS: 100 INJECTION, SOLUTION INTRAVENOUS; SUBCUTANEOUS at 00:47

## 2024-09-07 RX ADMIN — INSULIN LISPRO 2 UNITS: 100 INJECTION, SOLUTION INTRAVENOUS; SUBCUTANEOUS at 08:53

## 2024-09-07 RX ADMIN — LOSARTAN POTASSIUM 50 MG: 50 TABLET, FILM COATED ORAL at 08:57

## 2024-09-07 RX ADMIN — LEVOTHYROXINE SODIUM 300 MCG: 150 TABLET ORAL at 06:01

## 2024-09-07 RX ADMIN — APIXABAN 5 MG: 5 TABLET, FILM COATED ORAL at 08:58

## 2024-09-07 RX ADMIN — METOPROLOL SUCCINATE 100 MG: 100 TABLET, EXTENDED RELEASE ORAL at 08:55

## 2024-09-07 RX ADMIN — PHENAZOPYRIDINE 100 MG: 100 TABLET ORAL at 14:44

## 2024-09-07 RX ADMIN — INSULIN LISPRO 6 UNITS: 100 INJECTION, SOLUTION INTRAVENOUS; SUBCUTANEOUS at 12:48

## 2024-09-07 RX ADMIN — LEVETIRACETAM 1750 MG: 500 TABLET, FILM COATED ORAL at 08:58

## 2024-09-07 RX ADMIN — ASPIRIN 81 MG: 81 TABLET, COATED ORAL at 08:58

## 2024-09-07 RX ADMIN — LEVETIRACETAM 1750 MG: 500 TABLET, FILM COATED ORAL at 21:44

## 2024-09-07 RX ADMIN — APIXABAN 5 MG: 5 TABLET, FILM COATED ORAL at 17:06

## 2024-09-07 RX ADMIN — ATORVASTATIN CALCIUM 40 MG: 40 TABLET, FILM COATED ORAL at 17:06

## 2024-09-07 RX ADMIN — CEFTRIAXONE 2000 MG: 2 INJECTION, SOLUTION INTRAVENOUS at 23:15

## 2024-09-07 RX ADMIN — INSULIN GLARGINE 39 UNITS: 100 INJECTION, SOLUTION SUBCUTANEOUS at 00:47

## 2024-09-07 RX ADMIN — PANTOPRAZOLE SODIUM 40 MG: 40 TABLET, DELAYED RELEASE ORAL at 06:01

## 2024-09-07 RX ADMIN — INSULIN GLARGINE 39 UNITS: 100 INJECTION, SOLUTION SUBCUTANEOUS at 21:44

## 2024-09-07 RX ADMIN — ALLOPURINOL 300 MG: 300 TABLET ORAL at 08:58

## 2024-09-07 RX ADMIN — MONTELUKAST 10 MG: 10 TABLET, FILM COATED ORAL at 08:55

## 2024-09-07 RX ADMIN — PHENAZOPYRIDINE 100 MG: 100 TABLET ORAL at 17:06

## 2024-09-07 RX ADMIN — FUROSEMIDE 80 MG: 80 TABLET ORAL at 08:58

## 2024-09-07 RX ADMIN — INSULIN LISPRO 5 UNITS: 100 INJECTION, SOLUTION INTRAVENOUS; SUBCUTANEOUS at 21:45

## 2024-09-07 RX ADMIN — INSULIN GLARGINE 39 UNITS: 100 INJECTION, SOLUTION SUBCUTANEOUS at 09:03

## 2024-09-07 NOTE — ASSESSMENT & PLAN NOTE
Wt Readings from Last 3 Encounters:   09/07/24 (!) 159 kg (351 lb)   09/06/24 (!) 158 kg (349 lb)   08/17/24 (!) 166 kg (365 lb)     Daily weights  Continue Lasix  Continue beta-blocker  Currently euvolemic

## 2024-09-07 NOTE — PLAN OF CARE
Problem: PAIN - ADULT  Goal: Verbalizes/displays adequate comfort level or baseline comfort level  Description: Interventions:  - Encourage patient to monitor pain and request assistance  - Assess pain using appropriate pain scale  - Administer analgesics based on type and severity of pain and evaluate response  - Implement non-pharmacological measures as appropriate and evaluate response  - Consider cultural and social influences on pain and pain management  - Notify physician/advanced practitioner if interventions unsuccessful or patient reports new pain  Outcome: Progressing     Problem: INFECTION - ADULT  Goal: Absence or prevention of progression during hospitalization  Description: INTERVENTIONS:  - Assess and monitor for signs and symptoms of infection  - Monitor lab/diagnostic results  - Monitor all insertion sites, i.e. indwelling lines, tubes, and drains  - Monitor endotracheal if appropriate and nasal secretions for changes in amount and color  - [place] appropriate cooling/warming therapies per order  - Administer medications as ordered  - Instruct and encourage patient and family to use good hand hygiene technique  - Identify and instruct in appropriate isolation precautions for identified infection/condition  Outcome: Progressing  Goal: Absence of fever/infection during neutropenic period  Description: INTERVENTIONS:  - Monitor WBC    Outcome: Progressing

## 2024-09-07 NOTE — OCCUPATIONAL THERAPY NOTE
Occupational Therapy Evaluation     Patient Name: Pranav Vizcaino  Today's Date: 9/7/2024  Problem List  Principal Problem:    Pyelonephritis  Active Problems:    Benign essential HTN    Type 2 diabetes mellitus without complication, with long-term current use of insulin (HCC)    Morbid obesity due to excess calories (HCC)    Mixed hyperlipidemia    Seizure disorder (HCC)    Chronic diastolic CHF (congestive heart failure) (HCC)    History of atrial fibrillation    Past Medical History  Past Medical History:   Diagnosis Date    A-fib (HCC)     CHF (congestive heart failure) (HCC)     Diabetes mellitus (HCC)     Gout     High blood pressure      Past Surgical History  Past Surgical History:   Procedure Laterality Date    BRAIN TUMOR EXCISION      CHOLECYSTECTOMY      KIDNEY STONE SURGERY      KNEE SURGERY          09/07/24 0987   Note Type   Note type Evaluation   Pain Assessment   Pain Assessment Tool 0-10   Pain Score No Pain  (c/o burning during urination)   Restrictions/Precautions   Other Precautions Chair Alarm;Bed Alarm;Fall Risk   Home Living   Type of Home House  (2 [facility])   Home Layout One level;Performs ADLs on one level;Able to live on main level with bedroom/bathroom   Bathroom Shower/Tub Walk-in shower   Bathroom Toilet Raised   Bathroom Equipment Built-in shower seat;Grab bars in shower   Home Equipment Walker;Cane   Additional Comments Pt reports living in a 1SH with spouse. No AD PTA.   Prior Function   Level of [place] Independent with ADLs;Independent with functional mobility;Needs assistance with IADLS  (assist to don/doff socks/shoes)   Lives With Spouse   Receives Help From Family   IADLs Family/Friend/Other provides transportation;Independent with meal prep;Family/Friend/Other provides medication management   Falls in the last 6 months 0   ADL   UB Dressing Assistance 5  Supervision/Setup   UB Dressing Deficit Setup   LB Dressing Assistance 4  Minimal Assistance   LB Dressing Deficit  Setup;Verbal cueing;Increased time to complete;Don/doff R sock;Don/doff L sock   Additional Comments UB ADL @ S/set-up while seated. LB ADLs @ Min A, assist to don socks while seated. Clothing management while standing @ SBA   Bed Mobility   Additional Comments Pt OOB at beginning and end of session. Bed mobility not assessed at this time.   Transfers   Sit to Stand   (Steadying assist)   Additional items Assist x 1;Increased time required;Verbal cues   Stand to Sit   (SBA)   Additional items Increased time required;Verbal cues   Stand pivot   (SBA)   Additional items Increased time required;Verbal cues   Additional Comments Without the use of AD   Functional Mobility   Additional Comments Pt able to complete short distance functional mobility around room to EOB without AD @ SBA   Balance   Static Sitting Good   Dynamic Sitting Fair +   Static Standing Fair   Dynamic Standing Fair -   Activity Tolerance   Activity Tolerance Patient limited by fatigue;Patient tolerated treatment well   Nurse Made Aware Spoke with RN   RUE Assessment   RUE Assessment WFL   LUE Assessment   LUE Assessment WFL   Hand Function   Gross Motor Coordination Functional   Fine Motor Coordination Functional   Cognition   Overall Cognitive Status WFL   Arousal/Participation Alert;Responsive;Cooperative   Attention Within functional limits   Orientation Level Oriented X4   Memory Within functional limits   Following Commands Follows one step commands without difficulty   Assessment   Limitation Decreased ADL status;Decreased endurance;Decreased high-level ADLs;Decreased self-care trans   Prognosis Good   Assessment Pt is a 69 y.o. male, admitted to [facility]/6/[facility]24 d/t experiencing urinary frequency. Dx: pyelonephritis. Pt with PMHx impacting their performance during ADL tasks, including: A-Fib, CHF, DM, gout, hypertension. Prior to admission to the hospital Pt was performing ADLs without physical assistance. IADLs with physical assistance.  Functional transfers/ambulation without physical assistance. Cognitive status was PTA was Intact. OT order placed to assess Pt's ADLs, cognitive status, and performance during functional tasks in order to maximize safety and independence while making most appropriate d/c recommendations. Pt's clinical presentation is currently evolving given new onset deficits that effect Pt's occupational performance and ability to safely return to PLOF including decrease activity tolerance, decrease standing balance, decrease performance during ADL tasks, decrease generalized strength, and decrease performance during functional transfers combined with medical complications of tachycardia, abnormal CBC, abnormal blood sugars, and need for input for mobility technique/safety. Personal factors affecting Pt at time of initial evaluation include: step(s) to enter environment. Pt will benefit from continued skilled OT services to address deficits as defined above and to maximize level independence/participation during ADLs and functional tasks to facilitate return toward PLOF and improved quality of life. From an occupational therapy standpoint, recommendation at time of d/c would be Level III: Minimum Resource Intensity Therapy   Plan   Treatment Interventions ADL retraining;Visual perceptual retraining;Functional transfer training;UE strengthening/ROM;Endurance training;Cognitive reorientation;Patient/family training;Equipment evaluation/education;Neuromuscular reeducation;Compensatory technique education;Fine motor coordination activities;Continued evaluation;UE splinting;Cardiac education;Energy conservation;Activityengagement   Goal Expiration Date 09/21/24   OT Treatment Day 1   OT Frequency 2-3x/wk   Discharge Recommendation   Rehab Resource Intensity Level, OT III (Minimum Resource Intensity)   AM-PAC Daily Activity Inpatient   Lower Body Dressing 3   Bathing 3   Toileting 3   Upper Body Dressing 3   Grooming 3   Eating 4    Daily Activity Raw Score 19   Daily Activity Standardized Score (Calc for Raw Score >=11) 40.22   AM-PAC Applied Cognition Inpatient   Following a Speech/Presentation 3   Understanding Ordinary Conversation 4   Taking Medications 3   Remembering Where Things Are Placed or Put Away 4   Remembering List of 4-5 Errands 3   Taking Care of Complicated Tasks 3   Applied Cognition Raw Score 20   Applied Cognition Standardized Score 41.76   Additional Treatment Session   Start Time 1250   End Time 1300   Treatment Assessment Pt completed OT tx session #1 focused on ADL performance and functional mobility. Pt incontinent of bladder. STS without AD @ Steadying assist. Pt able to complete short distance functional mobility to/from bathroom without AD @ SBA. Toileting tasks and washing hands while standing @ SBA. Once seated, pt able to wash UB and don new hospital gown provided set-up. Pt washing perineal area and lower legs but required assist to wash feet and don/doff socks.   End of Consult   Patient Position at End of Consult Bedside chair;Bed/Chair alarm activated;All needs within reach     The patient's raw score on the AM-PAC Daily Activity Inpatient Short Form is 19. A raw score of greater than or equal to 19 suggests the patient may benefit from discharge to home. Please refer to the recommendation of the Occupational Therapist for safe discharge planning.    Pt goals to be met by 9/21/2024    Pt will demonstrate ability to complete grooming/hygiene tasks @ Mod I after set-up.  Pt will demonstrate ability to complete supine<>sit @ Mod I in order to increase safety and independence during ADL tasks.  Pt will demonstrate ability to complete UB ADLs including washing/dressing @ Mod I in order to increase performance and participation during meaningful tasks  Pt will demonstrate ability to complete LB dressing @ Mod I in order to increase safety and independence during meaningful tasks.   Pt will demonstrate ability to  nneka/doff socks/shoes while sitting EOB @ Mod I in order to increase safety and independence during meaningful tasks.   Pt will demonstrate ability to complete toileting tasks including CM and pericare @ Mod I in order to increase safety and independence during meaningful tasks.  Pt will demonstrate ability to complete EOB, chair, toilet/commode transfers @ Mod I in order to increase performance and participation during functional tasks.  Pt will demonstrate ability to stand for 5-8 minutes while maintaining Fair + balance during ADL/IADL tasks.  Pt will demonstrate ability to tolerate 30-35 minute OT session with no vc'ing for deep breathing or use of energy conservation techniques in order to increase activity tolerance during functional tasks.   Pt will demonstrate Good carryover of use of energy conservation/compensatory strategies during ADLs and functional tasks in order to increase safety and reduce risk for falls.   Pt will demonstrate Good attention and participation in continued evaluation of functional ambulation house hold distances in order to assist with safe d/c planning.  Pt will attend to continued cognitive assessments 100% of the time in order to provide most appropriate d/c recommendations.   Pt will follow 100% simple 2-step commands and be A&O x4 consistently with environmental cues to increase participation in functional activities.   Pt will identify 3 areas of interest/hobbies and 1 intervention on how to incorporate into daily life in order to increase interaction with environment and peers as well as increase participation in meaningful tasks.   Pt will demonstrate 100% carryover of BUE HEP in order to increase BUE MS and increase performance during functional tasks upon d/c home.    Danielle Mckee, OTR/L

## 2024-09-07 NOTE — ASSESSMENT & PLAN NOTE
Lab Results   Component Value Date    HGBA1C 6.5 (H) 04/16/2024       Recent Labs     09/06/24  1654 09/06/24  1844 09/06/24  2351   POCGLU 288* 311* 241*       Blood Sugar Average: Last 72 hrs:  (P) 276      Continue long-acting insulin 39 units every 12 hours  Sliding scale insulin  Diabetic diet  Before meals and at bedtime glucose checks

## 2024-09-07 NOTE — CASE MANAGEMENT
Case Management Assessment & Discharge Planning Note    Patient name Pranav Vizcaino  Location /-01 MRN [id number]  : 1955 Date 2024       Current Admission Date: 2024  Current Admission Diagnosis:Pyelonephritis   Patient Active Problem List    Diagnosis Date Noted Date Diagnosed    History of atrial fibrillation 2024     Pyelonephritis 2024     Benign essential HTN 04/15/2021     Type 2 diabetes mellitus without complication, with long-term current use of insulin ([facility]) 04/15/2021     JULISSA (acute kidney injury) ([facility]) 04/15/2021     Morbid obesity due to excess calories ([facility]) 04/15/2021     Mixed hyperlipidemia 04/15/2021     Seizure disorder ([facility]) 04/15/2021     Chronic diastolic CHF (congestive heart failure) ([facility]) 04/15/2021       LOS (days): 1  Geometric Mean LOS (GMLOS) (days):   Days to GMLOS:     OBJECTIVE:    Risk of Unplanned Readmission Score: 11.99         Current admission status: Inpatient  Referral Reason: VNA    Preferred Pharmacy:   [id number] - [place] [id number]  Phone: [phone] Fax: [phone]    Primary Care Provider: Ruslan Morales MD    Primary Insurance: [facility]  Secondary Insurance:     CM met with patient at the bedside,baseline information  was obtained. CM discussed the role of CM in helping the patient develop a discharge plan and assist the patient in carry out their plan.      ASSESSMENT:  Active Health Care Proxies    There are no active Health Care Proxies on file.       Advance Directives  Does patient have a Health Care POA?: Yes  Does patient have Advance Directives?:  (unsure)  Primary Contact: Caroline Vizcaino (Spouse)  [phone]         Readmission Root Cause  30 Day Readmission: No    Patient Information  Admitted from:: Home  Mental Status: Alert  During Assessment patient was accompanied by: Not accompanied during assessment  Assessment information provided by::  Patient  Primary Caregiver: Self  Support Systems: Spouse/significant other, Daughter  [place] [facility] Residence: [facility]  What city do you live in?: [place]  Home entry access options. Select all that apply.: Stairs  Number of steps to enter home.: 2  Do the steps have railings?: Yes  Type of Current Residence: [facility]  Living Arrangements: Lives w/ Spouse/significant other  Is patient a ?: Yes  Is patient active with [facility] ([facility])?: No    Activities of Daily Living Prior to Admission  Functional Status: Independent  Completes ADLs independently?: Yes  Ambulates independently?: Yes  Does patient use assisted devices?: No  Does patient currently own DME?: Yes  What DME does the patient currently own?: Walker, Straight Cane, CPAP  Does patient have a history of Outpatient Therapy (PT/OT)?: No  Does the patient have a history of Short-Term Rehab?: Yes ([facility])  Does patient have a history of HHC?: Yes ([facility])  Does patient currently have HHC?: No         Patient Information Continued  Income Source: Pension/skilled nursing  Does patient have prescription coverage?: Yes  Does patient receive dialysis treatments?: No  Does patient have a history of substance abuse?: No  Does patient have a history of Mental Health Diagnosis?: No         Means of Transportation  Means of Transport to Appts:: Family transport      Social Determinants of Health (SDOH)      Flowsheet Row Most Recent Value   Housing Stability    In the last 12 months, was there a time when you were not able to pay the mortgage or rent on time? N   In the past 12 months, how many times have you moved where you were living? 0   At any time in the past 12 months, were you homeless or living in a shelter (including now)? N   Transportation Needs    In the past 12 months, has lack of transportation kept you from medical appointments or from getting medications? no   In the past 12 months, has lack of transportation kept you from meetings, work, or from  getting things needed for daily living? No   Food Insecurity    Within the past 12 months, you worried that your food would run out before you got the money to buy more. Never true   Within the past 12 months, the food you bought just didn't last and you didn't have money to get more. Never true   Utilities    In the past 12 months has the electric, gas, oil, or water company threatened to shut off services in your home? No          Patient is .  His adult daughter resides very nearby.      DISCHARGE DETAILS:    Discharge planning discussed with:: patient and wife  Freedom of Choice: Yes  Comments - Freedom of Choice: Spouse was not interested in HHC, however he asked me to call his wife and she is agreeable for HHC- She is aware a blanket referral will be made to see which company can accept his insuranc and care needs  CM contacted family/caregiver?: Yes  Were Treatment Team discharge recommendations reviewed with patient/caregiver?: Yes  Did patient/caregiver verbalize understanding of patient care needs?: Yes       Contacts  Patient Contacts: Caroline Vizcaino (Spouse)  [phone]  Relationship to Patient:: Family  Contact Method: Phone  Reason/Outcome: Discharge Planning, Continuity of Care    Requested Home Health Care         Is the patient interested in HHC at discharge?: Yes  Home Health Discipline requested:: Occupational Therapy, Physical Therapy, Nursing  Home Health Follow-Up Provider:: PCP  Home Health Services Needed:: Strengthening/Theraputic Exercises to Improve Function, Gait/ADL Training  Homebound Criteria Met:: Requires the Assistance of Another Person for Safe Ambulation or to Leave the Home  Supporting Clincal Findings:: Limited Endurance    DME Referral Provided  Referral made for DME?: No    Other Referral/Resources/Interventions Provided:  Interventions: HHC  Referral Comments: [facility] referral for HHC was made, pending at this time.         Treatment Team Recommendation: Home with Home  Health Care  Discharge Destination Plan:: Home with Home Health Care  Transport at Discharge : Family         CM will follow as secure HHC for patient.

## 2024-09-07 NOTE — ASSESSMENT & PLAN NOTE
Pressure controlled continue home meds except losartan as patient received IV contrast last night hold for at least 48 hours

## 2024-09-07 NOTE — UTILIZATION REVIEW
Initial Clinical Review    Admission: Date/Time/Statement:   Admission Orders (From admission, onward)       Ordered        09/06/24 2118  INPATIENT ADMISSION  Once                          Orders Placed This Encounter   Procedures    INPATIENT ADMISSION     Standing Status:   Standing     Number of Occurrences:   1     Order Specific Question:   Level of Care     Answer:   Med Surg [16]     Order Specific Question:   Estimated length of stay     Answer:   More than 2 Midnights     Order Specific Question:   Certification     Answer:   I certify that inpatient services are medically necessary for this patient for a duration of greater than two midnights. See H&P and MD Progress Notes for additional information about the patient's course of treatment.     ED Arrival Information       Expected   -    Arrival   9/6/2024 18:14    Acuity   Emergent              Means of arrival   Walk-In    Escorted by   Spouse    Service   Hospitalist    Admission type   Emergency              Arrival complaint   frequent urination             Chief Complaint   Patient presents with    Possible UTI     Pt went to urgent care today for an increase in urinary frequency. Pt was told he has a UTI but urgent care was concerned for sepsis       Initial Presentation: 69 y.o. male to ED from urgent care w/ PMHX seizure disorder, hypertension, hyperlipidemia, morbid obesity, and diabetes w/  urinary complaints.  Found to have pyelonephritis started on ceftriaxone. Admitted IP status w/ pyelonephritis plan to cont ceftriaxone and f/u ua cx . CHF daily weight , cont laisx , cont BB . Seizures cont keppra , zonisamide. HLD statin . DM SSI and monitor . HTN cont lopressor , losartan , lasix .     PE:  Bilateral lower extremity PVD discoloration     Anticipated Length of Stay/Certification Statement: Patient will be admitted on an inpatient basis with an anticipated length of stay of greater than 2 midnights secondary to treatment for pyelonephritis.      Date: 9/7   Day 2: complaining of feeling hot also having some dysuria. Also complaining of some flank pain .cont ceftriaxone and f/u ua cx . Cont lasix , daily weight , cont BB . + R CVA and L CVA tenderness .     Date: 9/8  Day 3: Has surpassed a 2nd midnight with active treatments and services. complaining of bilateral flank pain. Dysuria is improving. Continue ceftriaxone. urine culture mixed contaminant.  Blood culture pending at this time.  Dysuria improving with Pyridium but still has bilateral flank pain. -1570 net output last 24 hrs . Wbc dec to 6.42 from 11.61. urology consulted , no plan for urology intervention .        ED Triage Vitals   Temperature Pulse Respirations Blood Pressure SpO2 Pain Score   09/06/24 1831 09/06/24 1831 09/06/24 1831 09/06/24 1831 09/06/24 1831 09/06/24 1854   100 °F (37.8 °C) (!) 115 18 162/80 97 % 3     Weight (last 2 days)       Date/Time Weight    09/07/24 0552 159 (351)    09/06/24 23:09:53 160 (351.63)    09/06/24 1831 158 (349)            Vital Signs (last 3 days)       Date/Time Temp Pulse Resp BP MAP (mmHg) SpO2 O2 Device Patient Position - Orthostatic VS Bay Coma Scale Score Pain    09/07/24 0905 -- -- -- -- -- -- None (Room air) -- 15 No Pain    09/07/24 0900 98.4 °F (36.9 °C) -- -- -- -- -- -- -- -- --    09/07/24 0855 -- 115 -- 148/88 -- -- -- -- -- --    09/07/24 07:16:35 98.4 °F (36.9 °C) 111 18 157/84 108 95 % -- -- -- --    09/06/24 2315 -- -- -- -- -- 96 % None (Room air) -- -- --    09/06/24 2310 -- -- -- -- -- -- -- -- -- No Pain    09/06/24 23:09:53 97.9 °F (36.6 °C) 94 16 141/75 97 96 % None (Room air) Lying -- --    09/06/24 2230 -- 96 -- 136/60 91 95 % -- -- -- --    09/06/24 2215 -- 93 -- 160/75 108 95 % -- -- -- --    09/06/24 2145 -- 94 -- 147/70 101 94 % -- -- -- --    09/06/24 2130 -- 94 -- 141/67 96 96 % -- -- -- --    09/06/24 2115 -- 97 -- 139/65 93 96 % -- -- -- --    09/06/24 2100 -- 98 -- 140/59 90 97 % -- -- -- 1    09/06/24 2030 -- 95  18 140/65 93 98 % -- -- -- --    09/06/24 1915 -- 104 -- -- -- 98 % -- -- -- --    09/06/24 1900 99.3 °F (37.4 °C) 108 18 144/71 100 97 % None (Room air) -- -- --    09/06/24 1854 -- -- -- -- -- -- -- -- 15 3    09/06/24 1831 100 °F (37.8 °C) 115 18 162/80 -- 97 % None (Room air) -- -- --              Pertinent Labs/Diagnostic Test Results:   Radiology:  CT abdomen pelvis with contrast   Final Interpretation by Eliel Hollis MD (09/06 2102)      Splenomegaly with lobular hepatic contour could be related to cirrhosis.      Left greater than right perinephric stranding could be related to pyelonephritis.      Stones in the right upper quadrant may be within a cystic duct remnant versus contracted gallbladder. The common duct is borderline at 7 mm. Correlation with surgical history is advised and LFTs. Nonemergent MRCP follow-up may be useful.      Right lower quadrant ventral hernia is identified without evidence of obstruction at this time.      This was discussed with Catarina GONZALEZ at 9:00 p.m.         Workstation performed: [id number]           Cardiology:  No orders to display     GI:  No orders to display           Results from last 7 days   Lab Units 09/06/24  1859   WBC Thousand/uL 11.61*   HEMOGLOBIN g/dL 13.6   HEMATOCRIT % 38.5   PLATELETS Thousands/uL 147*   TOTAL NEUT ABS Thousands/µL 9.67*         Results from last 7 days   Lab Units 09/06/24  1859   SODIUM mmol/L 136   POTASSIUM mmol/L 3.9   CHLORIDE mmol/L 106   CO2 mmol/L 24   ANION GAP mmol/L 6   BUN mg/dL 15   CREATININE mg/dL 1.19   EGFR ml/min/1.73sq m 61   CALCIUM mg/dL 9.8     Results from last 7 days   Lab Units 09/06/24  1859   AST U/L 16   ALT U/L 22   ALK PHOS U/L 103   TOTAL PROTEIN g/dL 6.9   ALBUMIN g/dL 4.0   TOTAL BILIRUBIN mg/dL 1.92*     Results from last 7 days   Lab Units 09/07/24  1135 09/07/24  0718 09/06/24  2351 09/06/24  1844 09/06/24  1654   POC GLUCOSE mg/dl 299* 260* 241* 311* 288*     Results from last 7 days   Lab Units  09/06/24  1859   GLUCOSE RANDOM mg/dL 295*         Results from last 7 days   Lab Units 09/06/24  1859   PROTIME seconds 16.9*   INR  1.34*   PTT seconds 34         Results from last 7 days   Lab Units 09/06/24  1859   PROCALCITONIN ng/ml 0.24     Results from last 7 days   Lab Units 09/06/24  1859   LACTIC ACID mmol/L 1.8         Results from last 7 days   Lab Units 09/06/24  1850 09/06/24  1659   CLARITY UA  Slightly Cloudy clear   COLOR UA  Yellow cliff   SPEC GRAV UA  1.020  --    PH UA  6.0  --    GLUCOSE UA mg/dl >=1000 (1%)* 2,000+   KETONES UA mg/dl Trace* trace   BLOOD UA  Large* large   PROTEIN UA mg/dl Trace* negative   NITRITE UA  Negative negative   BILIRUBIN UA  Small*  --    BILIRUBIN UA POC   --  small   UROBILINOGEN UA E.U./dl 2.0* 2   LEUKOCYTES UA  Trace* negative   WBC UA /hpf 30-50*  --    RBC UA /hpf 30-50*  --    BACTERIA UA /hpf Occasional  --    EPITHELIAL CELLS WET PREP /hpf Occasional  --            ED Treatment-Medication Administration from 09/06/2024 1812 to 09/06/2024 2303         Date/Time Order Dose Route Action     09/06/2024 1907 sodium chloride 0.9 % bolus 1,000 mL 1,000 mL Intravenous New Bag     09/06/2024 1915 acetaminophen (Ofirmev) injection 1,000 mg 1,000 mg Intravenous New Bag     09/06/2024 2036 cefTRIAXone (ROCEPHIN) IVPB (premix in dextrose) 1,000 mg 50 mL 1,000 mg Intravenous New Bag     09/06/2024 2039 iohexol (OMNIPAQUE) 350 MG/ML injection (MULTI-DOSE) 100 mL 100 mL Intravenous Given            Past Medical History:   Diagnosis Date    A-fib (HCC)     CHF (congestive heart failure) (HCC)     Diabetes mellitus (HCC)     Gout     High blood pressure      Present on Admission:   Morbid obesity due to excess calories (HCC)   Mixed hyperlipidemia   Seizure disorder (HCC)   Benign essential HTN   Chronic diastolic CHF (congestive heart failure) ([facility])      Admitting Diagnosis: Pyelonephritis [N12]  UTI symptoms [R39.9]  Age/Sex: 69 y.o. male  Admission Orders:  Scheduled  Medications:  allopurinol, 300 mg, Oral, Daily  apixaban, 5 mg, Oral, BID  aspirin, 81 mg, Oral, Daily  atorvastatin, 40 mg, Oral, After Dinner  cefTRIAXone, 2,000 mg, Intravenous, Q24H  furosemide, 80 mg, Oral, Daily  insulin glargine, 39 Units, Subcutaneous, Q12H SHANA  insulin lispro, 1-5 Units, Subcutaneous, 4x Daily (AC & HS)  levETIRAcetam, 1,750 mg, Oral, Q12H SHANA  levothyroxine, 300 mcg, Oral, Once per day on Monday Tuesday Wednesday Thursday Friday Saturday  losartan, 50 mg, Oral, Daily  metoprolol succinate, 100 mg, Oral, Daily  montelukast, 10 mg, Oral, Daily  pantoprazole, 40 mg, Oral, Daily Before Breakfast      Continuous IV Infusions:     PRN Meds:     PT OT eval   Weights   Fingerstick ac and hs   Tele   Cons carb diet     IP CONSULT TO CASE MANAGEMENT  IP CONSULT TO NUTRITION SERVICES    Network Utilization Review Department  ATTENTION: Please call with any questions or concerns to [phone] and carefully listen to the prompts so that you are directed to the right person. All voicemails are confidential.   For Discharge needs, contact Care Management DC Support Team at [phone] opt. [phone]  Send all requests for admission clinical reviews, approved or denied determinations and any other requests to dedicated fax number below belonging to the campus where the patient is receiving treatment. List of dedicated fax numbers for the Facilities:  FACILITY NAME UR FAX NUMBER   ADMISSION DENIALS (Administrative/Medical Necessity) [phone]   DISCHARGE SUPPORT TEAM (NETWORK) [phone]   PARENT CHILD HEALTH (Maternity/NICU/Pediatrics) [phone]   [facility] [phone]   [facility] [phone]   [facility] [phone]   [facility] [phone]   [facility] [phone]   [facility] [phone]   [facility]  [phone]   [facility] [phone]   [facility] [phone]   [facility] [phone]   [facility] [phone]   [facility] [phone]

## 2024-09-07 NOTE — H&P
[facility]  H&P  Name: Pranav Vizcaino 69 y.o. male I MRN: [id number]  Unit/Bed#: -01 I Date of Admission: 9/6/2024   Date of Service: 9/7/2024 I Hospital Day: 1      Assessment & Plan   * Pyelonephritis  Assessment & Plan  Patient is a 69-year-old male with a past medical history of hypertension, hyperlipidemia, diabetes, morbid obesity, seizure disorder, and CHF who presents to the hospital with urinary complaints.  He reports that he has had weakness dark urine and fever for the last few days, he went to urgent care who told him he may have sepsis and that he should come to the emergency room.  In the emergency room he not found to have sepsis however his urinalysis suggest infection and CAT scan suggest pyelonephritis.  He will be admitted to the hospital and treated for acute pyelonephritis.  Continue ceftriaxone  Follow urine culture    Chronic diastolic CHF (congestive heart failure) ([facility])  Assessment & Plan  Wt Readings from Last 3 Encounters:   09/06/24 (!) 160 kg (351 lb 10.1 oz)   09/06/24 (!) 158 kg (349 lb)   08/17/24 (!) 166 kg (365 lb)       Daily weights  Continue Lasix  Continue beta-blocker        Seizure disorder ([facility])  Assessment & Plan  Continue home dose Keppra twice daily  Continues on Zonisamide    Mixed hyperlipidemia  Assessment & Plan  Continue atorvastatin daily    Morbid obesity due to excess calories ([facility])  Assessment & Plan  Patient's BMI is 43.9  He would benefit greatly from a very low carbohydrate diet (less than 50 g/day) and/or intermittent fasting    Type 2 diabetes mellitus without complication, with long-term current use of insulin ([facility])  Assessment & Plan  Lab Results   Component Value Date    HGBA1C 6.5 (H) 04/16/2024       Recent Labs     09/06/24  1654 09/06/24  1844 09/06/24  2351   POCGLU 288* 311* 241*       Blood Sugar Average: Last 72 hrs:  (P) 276      Continue long-acting insulin 39 units every 12 hours  Sliding scale  insulin  Diabetic diet  Before meals and at bedtime glucose checks    Benign essential HTN  Assessment & Plan  Continue metoprolol daily  Continue losartan daily  Continue Lasix daily  Routine vital signs         VTE Pharmacologic Prophylaxis: VTE Score: 13 High Risk (Score >/= 5) - Pharmacological DVT Prophylaxis Ordered: apixaban (Eliquis). Sequential Compression Devices Ordered.  Code Status: Full code  Discussion with patient    Anticipated Length of Stay: Patient will be admitted on an inpatient basis with an anticipated length of stay of greater than 2 midnights secondary to treatment for pyelonephritis.    Total Time Spent on Date of Encounter in care of patient: 75 mins. This time was spent on one or more of the following: performing physical exam; counseling and coordination of care; obtaining or reviewing history; documenting in the medical record; reviewing/ordering tests, medications or procedures; communicating with other healthcare professionals and discussing with patient's family/caregivers.    Chief Complaint: Urinary frequency, dark urine, diarrhea, weakness    History of Present Illness:  Pranav Vizcaino is a 69-year-old male with past medical history of seizure disorder, hypertension, hyperlipidemia, morbid obesity, and diabetes who presents to the hospital from urgent care for urinary complaints.  At urgent care they sent him to emergency room for further evaluation.  In emergency room he was found to have pyelonephritis started on ceftriaxone.    Review of Systems:  Review of Systems   Constitutional:  Positive for chills and fatigue. Negative for fever.   HENT:  Negative for ear pain and sore throat.    Eyes:  Negative for pain and visual disturbance.   Respiratory:  Negative for cough and shortness of breath.    Cardiovascular:  Negative for chest pain and palpitations.   Gastrointestinal:  Positive for diarrhea. Negative for abdominal pain.   Genitourinary:  Positive for flank pain and  frequency. Negative for dysuria and hematuria.        Dark urine   Musculoskeletal:  Negative for arthralgias and back pain.   Skin:  Negative for color change and rash.   Neurological:  Negative for seizures and syncope.   All other systems reviewed and are negative.      Past Medical and Surgical History:   Past Medical History:   Diagnosis Date    A-fib (HCC)     CHF (congestive heart failure) (HCC)     Diabetes mellitus (HCC)     Gout     High blood pressure        Past Surgical History:   Procedure Laterality Date    BRAIN TUMOR EXCISION      CHOLECYSTECTOMY      KIDNEY STONE SURGERY      KNEE SURGERY         Meds/Allergies:  Prior to Admission medications    Medication Sig Start Date End Date Taking? Authorizing Provider   Admelog SoloStar 100 units/mL injection pen 26 Units 3 (three) times a day with meals 7/2/24  Yes Historical Provider, MD   ALLOPURINOL PO Take 300 mg by mouth daily    Yes Historical Provider, MD   apixaban (ELIQUIS) 5 mg Take 5 mg by mouth 2 (two) times a day 1/12/21  Yes Historical Provider, MD   ascorbic acid (VITAMIN C) 1000 MG tablet Take 1 tablet (1,000 mg total) by mouth daily for 12 doses 4/16/21 9/6/24 Yes Mary Lou Geller MD   aspirin (ECOTRIN LOW STRENGTH) 81 mg EC tablet Take by mouth   Yes Historical Provider, MD   Atorvastatin Calcium (LIPITOR PO) Take 40 mg by mouth daily    Yes Historical Provider, MD   cholecalciferol (VITAMIN D3) 1,000 units tablet Take 2 tablets (2,000 Units total) by mouth daily for 12 days 4/17/21 9/6/24 Yes Mary Lou Geller MD   Furosemide (LASIX PO) Take 80 mg by mouth daily    Yes Historical Provider, MD   insulin degludec (Tresiba FlexTouch) 100 units/mL injection pen Inject 60 Units under the skin every 12 (twelve) hours  Patient taking differently: Inject 39 Units under the skin every 12 (twelve) hours 4/16/21  Yes Mary Lou Geller MD   levETIRAcetam (KEPPRA) 250 mg tablet Take 7 tablets by mouth every 12 (twelve) hours 1/12/21 9/6/24 Yes Historical Provider,  MD   levothyroxine (SYNTHROID) 300 MCG tablet Take 300 mcg by mouth daily Mon to saturday   Yes Historical Provider, MD   losartan (COZAAR) 50 mg tablet Take 50 mg by mouth 1/12/21  Yes Historical Provider, MD   metoprolol succinate (TOPROL-XL) 100 mg 24 hr tablet Take 100 mg by mouth daily 1/12/21  Yes Historical Provider, MD   montelukast (SINGULAIR) 10 mg tablet Take 10 mg by mouth in the morning 1/12/21  Yes Historical Provider, MD   pantoprazole (PROTONIX) 40 mg tablet Take 40 mg by mouth 1/12/21  Yes Historical Provider, MD   Potassium (POTASSIMIN PO) Take by mouth   Yes Historical Provider, MD   semaglutide, 1 mg/dose, (Ozempic, 1 MG/DOSE,) 4 mg/3 mL injection pen 2 mg   Yes Historical Provider, MD   zonisamide (ZONEGRAN) 100 mg capsule Take 200 mg by mouth daily 3/26/24  Yes Historical Provider, MD   acetaminophen (TYLENOL) 325 mg tablet Take 2 tablets (650 mg total) by mouth every 6 (six) hours as needed for mild pain 4/16/21   Mary Lou Geller MD   albuterol (PROVENTIL HFA,VENTOLIN HFA) 90 mcg/act inhaler Inhale 2 puffs every 4 (four) hours as needed for wheezing  Patient not taking: Reported on 8/17/2024 4/16/21   Mary Lou Geller MD   zinc sulfate (ZINCATE) 220 mg capsule Take 1 capsule (220 mg total) by mouth daily for 5 doses  Patient not taking: Reported on 9/6/2024 4/17/21 4/22/21  Mary Lou Geller MD     I have reviewed home medications with patient personally.    Allergies:   Allergies   Allergen Reactions    Aspartame - Food Allergy Anaphylaxis     ALL ARTIFICIAL SWEETNERS    Bactrim [Sulfamethoxazole-Trimethoprim] Anaphylaxis    Trimethoprim Other (See Comments)    Adhesive  [Medical Tape] Rash     PAPER TAPE ONLY per paper       Social History:  Marital Status: /Civil Union   Occupation: Retired  Patient Pre-hospital Living Situation: Home, With spouse  Patient Pre-hospital Level of Mobility: walks  Patient Pre-hospital Diet Restrictions: None whatsoever  Substance Use History:   Social History  "    Substance and Sexual Activity   Alcohol Use Yes    Comment: rarely     Social History     Tobacco Use   Smoking Status Never   Smokeless Tobacco Never     Social History     Substance and Sexual Activity   Drug Use No       Family History:  Family History   Problem Relation Age of Onset    Cancer Mother     Cancer Father        Physical Exam:     Vitals:   Blood Pressure: 141/75 (09/06/24 2309)  Pulse: 94 (09/06/24 2309)  Temperature: 97.9 °F (36.6 °C) (09/06/24 2309)  Temp Source: Temporal (09/06/24 2309)  Respirations: 16 (09/06/24 2309)  Height: 6' 3\" (190.5 cm) (09/06/24 2309)  Weight - Scale: (!) 160 kg (351 lb 10.1 oz) (09/06/24 2309)  SpO2: 96 % (09/06/24 2315)    Physical Exam  Vitals and nursing note reviewed.   Constitutional:       General: He is not in acute distress.     Appearance: He is well-developed. He is morbidly obese.   HENT:      Head: Normocephalic and atraumatic.   Eyes:      Conjunctiva/sclera: Conjunctivae normal.   Cardiovascular:      Rate and Rhythm: Regular rhythm. Tachycardia present.      Pulses: Normal pulses.      Heart sounds: Normal heart sounds. No murmur heard.  Pulmonary:      Effort: Pulmonary effort is normal. No respiratory distress.      Breath sounds: Normal breath sounds.   Abdominal:      General: Bowel sounds are normal.      Palpations: Abdomen is soft.      Tenderness: There is no abdominal tenderness.      Comments: Morbidly obese   Musculoskeletal:         General: No swelling. Normal range of motion.      Cervical back: Normal range of motion and neck supple.   Skin:     General: Skin is warm and dry.      Capillary Refill: Capillary refill takes less than 2 seconds.      Comments: Bilateral lower extremity PVD discoloration   Neurological:      General: No focal deficit present.      Mental Status: He is alert.   Psychiatric:         Mood and Affect: Mood normal.         Behavior: Behavior normal.          Additional Data:     Lab Results:  Results from last 7 " days   Lab Units 09/06/24  1859   WBC Thousand/uL 11.61*   HEMOGLOBIN g/dL 13.6   HEMATOCRIT % 38.5   PLATELETS Thousands/uL 147*   SEGS PCT % 83*   LYMPHO PCT % 7*   MONO PCT % 8   EOS PCT % 1     Results from last 7 days   Lab Units 09/06/24  1859   SODIUM mmol/L 136   POTASSIUM mmol/L 3.9   CHLORIDE mmol/L 106   CO2 mmol/L 24   BUN mg/dL 15   CREATININE mg/dL 1.19   ANION GAP mmol/L 6   CALCIUM mg/dL 9.8   ALBUMIN g/dL 4.0   TOTAL BILIRUBIN mg/dL 1.92*   ALK PHOS U/L 103   ALT U/L 22   AST U/L 16   GLUCOSE RANDOM mg/dL 295*     Results from last 7 days   Lab Units 09/06/24  1859   INR  1.34*     Results from last 7 days   Lab Units 09/06/24  2351 09/06/24  1844 09/06/24  1654   POC GLUCOSE mg/dl 241* 311* 288*     Lab Results   Component Value Date    HGBA1C 6.5 (H) 04/16/2024    HGBA1C 10.0 (H) 04/05/2023    HGBA1C 9.7 (H) 03/13/2023     Results from last 7 days   Lab Units 09/06/24  1859   LACTIC ACID mmol/L 1.8   PROCALCITONIN ng/ml 0.24       Lines/Drains:  Invasive Devices       Peripheral Intravenous Line  Duration             Peripheral IV 09/06/24 Dorsal (posterior);Right Wrist <1 day                        Imaging: Reviewed radiology reports from this admission including: abdominal/pelvic CT  CT abdomen pelvis with contrast   Final Result by Eliel Hollis MD (09/06 2102)      Splenomegaly with lobular hepatic contour could be related to cirrhosis.      Left greater than right perinephric stranding could be related to pyelonephritis.      Stones in the right upper quadrant may be within a cystic duct remnant versus contracted gallbladder. The common duct is borderline at 7 mm. Correlation with surgical history is advised and LFTs. Nonemergent MRCP follow-up may be useful.      Right lower quadrant ventral hernia is identified without evidence of obstruction at this time.      This was discussed with Catarina GONZALEZ at 9:00 p.m.         Workstation performed: [id number]             EKG and Other Studies  Reviewed on Admission:   EKG: Sinus Tachycardia. .    ** Please Note: This note has been constructed using a voice recognition system. **

## 2024-09-07 NOTE — ASSESSMENT & PLAN NOTE
Patient is a 69-year-old male with a past medical history of hypertension, hyperlipidemia, diabetes, morbid obesity, seizure disorder, and CHF who presents to the hospital with urinary complaints.  He reports that he has had weakness dark urine and fever for the last few days, he went to urgent care who told him he may have sepsis and that he should come to the emergency room.  In the emergency room he not found to have sepsis however his urinalysis suggest infection and CAT scan suggest pyelonephritis.  He will be admitted to the hospital and treated for acute pyelonephritis.  Continue ceftriaxone  Follow urine culture

## 2024-09-07 NOTE — ASSESSMENT & PLAN NOTE
Lab Results   Component Value Date    HGBA1C 6.5 (H) 04/16/2024       Recent Labs     09/06/24  1844 09/06/24  2351 09/07/24  0718 09/07/24  1135   POCGLU 311* 241* 260* 299*       Blood Sugar Average: Last 72 hrs:  (P) 277.75  Continue long-acting insulin 39 units every 12 hours  Sliding scale insulin  Diabetic diet  Before meals and at bedtime glucose checks

## 2024-09-07 NOTE — ASSESSMENT & PLAN NOTE
Wt Readings from Last 3 Encounters:   09/06/24 (!) 160 kg (351 lb 10.1 oz)   09/06/24 (!) 158 kg (349 lb)   08/17/24 (!) 166 kg (365 lb)       Daily weights  Continue Lasix  Continue beta-blocker

## 2024-09-07 NOTE — PLAN OF CARE
Problem: OCCUPATIONAL THERAPY ADULT  Goal: Performs self-care activities at highest level of function for planned discharge setting.  See evaluation for individualized goals.  Description: Treatment Interventions: ADL retraining, Visual perceptual retraining, Functional transfer training, UE strengthening/ROM, Endurance training, Cognitive reorientation, Patient/family training, Equipment evaluation/education, Neuromuscular reeducation, Compensatory technique education, Fine motor coordination activities, Continued evaluation, UE splinting, Cardiac education, Energy conservation, Activityengagement          See flowsheet documentation for full assessment, interventions and recommendations.   Note: Limitation: Decreased ADL status, Decreased endurance, Decreased high-level ADLs, Decreased self-care trans  Prognosis: Good  Assessment: Pt is a 69 y.o. male, admitted to [facility] 9/6/2024 d/t experiencing urinary frequency. Dx: pyelonephritis. Pt with PMHx impacting their performance during ADL tasks, including: A-Fib, CHF, DM, gout, hypertension. Prior to admission to the hospital Pt was performing ADLs without physical assistance. IADLs with physical assistance. Functional transfers/ambulation without physical assistance. Cognitive status was PTA was Intact. OT order placed to assess Pt's ADLs, cognitive status, and performance during functional tasks in order to maximize safety and independence while making most appropriate d/c recommendations. Pt's clinical presentation is currently evolving given new onset deficits that effect Pt's occupational performance and ability to safely return to PLOF including decrease activity tolerance, decrease standing balance, decrease performance during ADL tasks, decrease generalized strength, and decrease performance during functional transfers combined with medical complications of tachycardia, abnormal CBC, abnormal blood sugars, and need for input for mobility technique/safety.  Personal factors affecting Pt at time of initial evaluation include: step(s) to enter environment. Pt will benefit from continued skilled OT services to address deficits as defined above and to maximize level independence/participation during ADLs and functional tasks to facilitate return toward PLOF and improved quality of life. From an occupational therapy standpoint, recommendation at time of d/c would be Level III: Minimum Resource Intensity Therapy     Rehab Resource Intensity Level, OT: III (Minimum Resource Intensity)

## 2024-09-07 NOTE — ASSESSMENT & PLAN NOTE
Patient's BMI is 43.9  He would benefit greatly from a very low carbohydrate diet (less than 50 g/day) and/or intermittent fasting

## 2024-09-07 NOTE — PROGRESS NOTES
[facility]  Progress Note  Name: Pranav Vizcaino I  MRN: [id number]  Unit/Bed#: -01 I Date of Admission: 9/6/2024   Date of Service: 9/7/2024 I Hospital Day: 1    Assessment & Plan   * Pyelonephritis  Assessment & Plan  Patient is a 69-year-old male with a past medical history of hypertension, hyperlipidemia, diabetes, morbid obesity, seizure disorder, and CHF who presents to the hospital with urinary complaints.  He reports that he has had weakness dark urine and fever for the last few days, he went to urgent care who told him he may have sepsis and that he should come to the emergency room.  In the emergency room he not found to have sepsis however his urinalysis suggest infection and CAT scan suggest pyelonephritis.  He will be admitted to the hospital and treated for acute pyelonephritis.  Continue ceftriaxone  Follow urine culture    History of atrial fibrillation  Assessment & Plan  Currently in sinus rhythm  Continue Eliquis  Continue beta-blocker    Chronic diastolic CHF (congestive heart failure) ([facility])  Assessment & Plan  Wt Readings from Last 3 Encounters:   09/07/24 (!) 159 kg (351 lb)   09/06/24 (!) 158 kg (349 lb)   08/17/24 (!) 166 kg (365 lb)     Daily weights  Continue Lasix  Continue beta-blocker  Currently euvolemic          Seizure disorder ([facility])  Assessment & Plan  Continue home dose Keppra twice daily  Continues on Zonisamide    Type 2 diabetes mellitus without complication, with long-term current use of insulin ([facility])  Assessment & Plan  Lab Results   Component Value Date    HGBA1C 6.5 (H) 04/16/2024       Recent Labs     09/06/24  1844 09/06/24  2351 09/07/24  0718 09/07/24  1135   POCGLU 311* 241* 260* 299*       Blood Sugar Average: Last 72 hrs:  (P) 277.75  Continue long-acting insulin 39 units every 12 hours  Sliding scale insulin  Diabetic diet  Before meals and at bedtime glucose checks    Benign essential HTN  Assessment & Plan  Pressure controlled  continue home meds except losartan as patient received IV contrast last night hold for at least 48 hours               VTE Pharmacologic Prophylaxis: VTE Score: 13 Moderate Risk (Score 3-4) - Pharmacological DVT Prophylaxis Ordered: apixaban (Eliquis).    Mobility:   Basic Mobility Inpatient Raw Score: 22  JH-HLM Goal: 7: Walk 25 feet or more  JH-HLM Achieved: 7: Walk 25 feet or more  JH-HLM Goal achieved. Continue to encourage appropriate mobility.    Patient Centered Rounds: I performed bedside rounds with nursing staff today.   Discussions with Specialists or Other Care Team Provider: none    Education and Discussions with Family / Patient:     Total Time Spent on Date of Encounter in care of patient: 35 mins. This time was spent on one or more of the following: performing physical exam; counseling and coordination of care; obtaining or reviewing history; documenting in the medical record; reviewing/ordering tests, medications or procedures; communicating with other healthcare professionals and discussing with patient's family/caregivers.    Current Length of Stay: 1 day(s)  Current Patient Status: Inpatient   Certification Statement: The patient will continue to require additional inpatient hospital stay due to acute pyelonephritis  Discharge Plan: Anticipate discharge in 24-48 hrs to home.    Code Status: Prior    Subjective:   Patient complaining of feeling hot also having some dysuria.  Also complaining of some flank pain    Objective:     Vitals:   Temp (24hrs), Av.1 °F (37.3 °C), Min:97.9 °F (36.6 °C), Max:100.3 °F (37.9 °C)    Temp:  [97.9 °F (36.6 °C)-100.3 °F (37.9 °C)] 98.4 °F (36.9 °C)  HR:  [] 115  Resp:  [16-18] 18  BP: (132-162)/(59-88) 148/88  SpO2:  [94 %-98 %] 95 %  Body mass index is 43.87 kg/m².     Input and Output Summary (last 24 hours):     Intake/Output Summary (Last 24 hours) at 2024 1249  Last data filed at 2024 1136  Gross per 24 hour   Intake 1270 ml   Output 700 ml    Net 570 ml       Physical Exam:   Physical Exam  Vitals and nursing note reviewed.   Constitutional:       Appearance: He is ill-appearing.   HENT:      Head: Normocephalic and atraumatic.      Right Ear: External ear normal.      Left Ear: External ear normal.      Nose: Nose normal.      Mouth/Throat:      Pharynx: Oropharynx is clear.   Eyes:      Pupils: Pupils are equal, round, and reactive to light.   Cardiovascular:      Rate and Rhythm: Normal rate and regular rhythm.      Heart sounds: Normal heart sounds.   Pulmonary:      Effort: Pulmonary effort is normal.      Breath sounds: Normal breath sounds.   Abdominal:      General: Bowel sounds are normal.      Palpations: Abdomen is soft.      Tenderness: There is no abdominal tenderness. There is right CVA tenderness and left CVA tenderness.   Musculoskeletal:         General: Normal range of motion.      Cervical back: Normal range of motion and neck supple.   Skin:     General: Skin is warm and dry.      Capillary Refill: Capillary refill takes less than 2 seconds.   Neurological:      General: No focal deficit present.      Mental Status: He is alert and oriented to person, place, and time.   Psychiatric:         Mood and Affect: Mood normal.            Additional Data:     Labs:  Results from last 7 days   Lab Units 09/06/24  1859   WBC Thousand/uL 11.61*   HEMOGLOBIN g/dL 13.6   HEMATOCRIT % 38.5   PLATELETS Thousands/uL 147*   SEGS PCT % 83*   LYMPHO PCT % 7*   MONO PCT % 8   EOS PCT % 1     Results from last 7 days   Lab Units 09/06/24  1859   SODIUM mmol/L 136   POTASSIUM mmol/L 3.9   CHLORIDE mmol/L 106   CO2 mmol/L 24   BUN mg/dL 15   CREATININE mg/dL 1.19   ANION GAP mmol/L 6   CALCIUM mg/dL 9.8   ALBUMIN g/dL 4.0   TOTAL BILIRUBIN mg/dL 1.92*   ALK PHOS U/L 103   ALT U/L 22   AST U/L 16   GLUCOSE RANDOM mg/dL 295*     Results from last 7 days   Lab Units 09/06/24  1859   INR  1.34*     Results from last 7 days   Lab Units 09/07/24  1135  09/07/24  0718 09/06/24  2351 09/06/24  1844 09/06/24  1654   POC GLUCOSE mg/dl 299* 260* 241* 311* 288*         Results from last 7 days   Lab Units 09/06/24  1859   LACTIC ACID mmol/L 1.8   PROCALCITONIN ng/ml 0.24       Lines/Drains:  Invasive Devices       Peripheral Intravenous Line  Duration             Peripheral IV 09/06/24 Dorsal (posterior);Right Wrist <1 day                          Imaging: Reviewed radiology reports from this admission including: abdominal/pelvic CT    Recent Cultures (last 7 days):         Last 24 Hours Medication List:   Current Facility-Administered Medications   Medication Dose Route Frequency Provider Last Rate    allopurinol  300 mg Oral Daily RIA Mullen      apixaban  5 mg Oral BID RIA Mullen      aspirin  81 mg Oral Daily RIA Mullen      atorvastatin  40 mg Oral After Dinner RIA Mullen      cefTRIAXone  2,000 mg Intravenous Q24H RIA Mullen      furosemide  80 mg Oral Daily RIA Mullen      insulin glargine  39 Units Subcutaneous Q12H [facility] RIA Mullen      insulin lispro  1-6 Units Subcutaneous HS Mary Lou Geller MD      insulin lispro  2-12 Units Subcutaneous TID AC Mary Lou Geller MD      levETIRAcetam  1,750 mg Oral Q12H [facility] RIA Mullen      levothyroxine  300 mcg Oral Once per day on Monday Tuesday Wednesday Thursday Friday Saturday RIA Mullen      metoprolol succinate  100 mg Oral Daily RIA Mullen      montelukast  10 mg Oral Daily RIA Mullen      pantoprazole  40 mg Oral Daily Before Breakfast RIA Mullen          Today, Patient Was Seen By: Mary Lou Geller MD    **Please Note: This note may have been constructed using a voice recognition system.**

## 2024-09-08 ENCOUNTER — TELEPHONE (OUTPATIENT)
Dept: OTHER | Facility: HOSPITAL | Age: 69
End: 2024-09-08

## 2024-09-08 LAB
ANION GAP SERPL CALCULATED.3IONS-SCNC: 7 MMOL/L (ref 4–13)
BACTERIA UR CULT: NORMAL
BUN SERPL-MCNC: 14 MG/DL (ref 5–25)
CALCIUM SERPL-MCNC: 9.4 MG/DL (ref 8.4–10.2)
CHLORIDE SERPL-SCNC: 109 MMOL/L (ref 96–108)
CO2 SERPL-SCNC: 23 MMOL/L (ref 21–32)
CREAT SERPL-MCNC: 1.08 MG/DL (ref 0.6–1.3)
ERYTHROCYTE [DISTWIDTH] IN BLOOD BY AUTOMATED COUNT: 14.3 % (ref 11.6–15.1)
GFR SERPL CREATININE-BSD FRML MDRD: 69 ML/MIN/1.73SQ M
GLUCOSE SERPL-MCNC: 216 MG/DL (ref 65–140)
GLUCOSE SERPL-MCNC: 224 MG/DL (ref 65–140)
GLUCOSE SERPL-MCNC: 226 MG/DL (ref 65–140)
GLUCOSE SERPL-MCNC: 233 MG/DL (ref 65–140)
GLUCOSE SERPL-MCNC: 269 MG/DL (ref 65–140)
HCT VFR BLD AUTO: 35.1 % (ref 36.5–49.3)
HGB BLD-MCNC: 12.2 G/DL (ref 12–17)
MCH RBC QN AUTO: 30 PG (ref 26.8–34.3)
MCHC RBC AUTO-ENTMCNC: 34.8 G/DL (ref 31.4–37.4)
MCV RBC AUTO: 86 FL (ref 82–98)
PLATELET # BLD AUTO: 145 THOUSANDS/UL (ref 149–390)
PMV BLD AUTO: 10.4 FL (ref 8.9–12.7)
POTASSIUM SERPL-SCNC: 3.3 MMOL/L (ref 3.5–5.3)
RBC # BLD AUTO: 4.07 MILLION/UL (ref 3.88–5.62)
SODIUM SERPL-SCNC: 139 MMOL/L (ref 135–147)
WBC # BLD AUTO: 6.42 THOUSAND/UL (ref 4.31–10.16)

## 2024-09-08 PROCEDURE — 99232 SBSQ HOSP IP/OBS MODERATE 35: CPT | Performed by: FAMILY MEDICINE

## 2024-09-08 PROCEDURE — 97162 PT EVAL MOD COMPLEX 30 MIN: CPT

## 2024-09-08 PROCEDURE — 85027 COMPLETE CBC AUTOMATED: CPT | Performed by: FAMILY MEDICINE

## 2024-09-08 PROCEDURE — 99222 1ST HOSP IP/OBS MODERATE 55: CPT | Performed by: UROLOGY

## 2024-09-08 PROCEDURE — 80048 BASIC METABOLIC PNL TOTAL CA: CPT | Performed by: FAMILY MEDICINE

## 2024-09-08 PROCEDURE — 82948 REAGENT STRIP/BLOOD GLUCOSE: CPT

## 2024-09-08 RX ORDER — INSULIN LISPRO 100 [IU]/ML
5 INJECTION, SOLUTION INTRAVENOUS; SUBCUTANEOUS
Status: DISCONTINUED | OUTPATIENT
Start: 2024-09-08 | End: 2024-09-10 | Stop reason: HOSPADM

## 2024-09-08 RX ORDER — ACETAMINOPHEN 325 MG/1
650 TABLET ORAL EVERY 6 HOURS PRN
Status: DISCONTINUED | OUTPATIENT
Start: 2024-09-08 | End: 2024-09-10 | Stop reason: HOSPADM

## 2024-09-08 RX ORDER — POTASSIUM CHLORIDE 1500 MG/1
40 TABLET, EXTENDED RELEASE ORAL ONCE
Status: COMPLETED | OUTPATIENT
Start: 2024-09-08 | End: 2024-09-08

## 2024-09-08 RX ORDER — OXYCODONE HYDROCHLORIDE 5 MG/1
5 TABLET ORAL EVERY 6 HOURS PRN
Status: DISCONTINUED | OUTPATIENT
Start: 2024-09-08 | End: 2024-09-10 | Stop reason: HOSPADM

## 2024-09-08 RX ADMIN — METOPROLOL SUCCINATE 100 MG: 100 TABLET, EXTENDED RELEASE ORAL at 08:08

## 2024-09-08 RX ADMIN — ASPIRIN 81 MG: 81 TABLET, COATED ORAL at 08:08

## 2024-09-08 RX ADMIN — INSULIN LISPRO 5 UNITS: 100 INJECTION, SOLUTION INTRAVENOUS; SUBCUTANEOUS at 12:30

## 2024-09-08 RX ADMIN — OXYCODONE HYDROCHLORIDE 5 MG: 5 TABLET ORAL at 19:38

## 2024-09-08 RX ADMIN — INSULIN LISPRO 6 UNITS: 100 INJECTION, SOLUTION INTRAVENOUS; SUBCUTANEOUS at 12:30

## 2024-09-08 RX ADMIN — INSULIN LISPRO 5 UNITS: 100 INJECTION, SOLUTION INTRAVENOUS; SUBCUTANEOUS at 17:05

## 2024-09-08 RX ADMIN — OXYCODONE HYDROCHLORIDE 5 MG: 5 TABLET ORAL at 10:47

## 2024-09-08 RX ADMIN — INSULIN LISPRO 5 UNITS: 100 INJECTION, SOLUTION INTRAVENOUS; SUBCUTANEOUS at 08:05

## 2024-09-08 RX ADMIN — ATORVASTATIN CALCIUM 40 MG: 40 TABLET, FILM COATED ORAL at 17:04

## 2024-09-08 RX ADMIN — PHENAZOPYRIDINE 100 MG: 100 TABLET ORAL at 08:08

## 2024-09-08 RX ADMIN — POTASSIUM CHLORIDE 40 MEQ: 1500 TABLET, EXTENDED RELEASE ORAL at 08:08

## 2024-09-08 RX ADMIN — PANTOPRAZOLE SODIUM 40 MG: 40 TABLET, DELAYED RELEASE ORAL at 08:08

## 2024-09-08 RX ADMIN — LEVETIRACETAM 1750 MG: 500 TABLET, FILM COATED ORAL at 08:07

## 2024-09-08 RX ADMIN — PHENAZOPYRIDINE 100 MG: 100 TABLET ORAL at 12:41

## 2024-09-08 RX ADMIN — MONTELUKAST 10 MG: 10 TABLET, FILM COATED ORAL at 08:11

## 2024-09-08 RX ADMIN — INSULIN LISPRO 4 UNITS: 100 INJECTION, SOLUTION INTRAVENOUS; SUBCUTANEOUS at 17:05

## 2024-09-08 RX ADMIN — ALLOPURINOL 300 MG: 300 TABLET ORAL at 08:09

## 2024-09-08 RX ADMIN — INSULIN LISPRO 4 UNITS: 100 INJECTION, SOLUTION INTRAVENOUS; SUBCUTANEOUS at 08:05

## 2024-09-08 RX ADMIN — APIXABAN 5 MG: 5 TABLET, FILM COATED ORAL at 08:09

## 2024-09-08 RX ADMIN — FUROSEMIDE 80 MG: 80 TABLET ORAL at 08:09

## 2024-09-08 RX ADMIN — APIXABAN 5 MG: 5 TABLET, FILM COATED ORAL at 17:04

## 2024-09-08 RX ADMIN — LEVETIRACETAM 1750 MG: 500 TABLET, FILM COATED ORAL at 21:28

## 2024-09-08 RX ADMIN — INSULIN LISPRO 2 UNITS: 100 INJECTION, SOLUTION INTRAVENOUS; SUBCUTANEOUS at 21:28

## 2024-09-08 RX ADMIN — CEFTRIAXONE 2000 MG: 2 INJECTION, SOLUTION INTRAVENOUS at 22:37

## 2024-09-08 RX ADMIN — PHENAZOPYRIDINE 100 MG: 100 TABLET ORAL at 17:04

## 2024-09-08 RX ADMIN — INSULIN GLARGINE 39 UNITS: 100 INJECTION, SOLUTION SUBCUTANEOUS at 21:28

## 2024-09-08 RX ADMIN — INSULIN GLARGINE 39 UNITS: 100 INJECTION, SOLUTION SUBCUTANEOUS at 08:05

## 2024-09-08 NOTE — PLAN OF CARE
Problem: Potential for Falls  Goal: Patient will remain free of falls  Description: INTERVENTIONS:  - Educate patient/family on patient safety including physical limitations  - Instruct patient to call for assistance with activity   - Consult OT/PT to assist with strengthening/mobility   - Keep Call bell within reach  - Keep bed low and locked with side rails adjusted as appropriate  - Keep care items and personal belongings within reach  - Initiate and maintain comfort rounds  - Make Fall Risk Sign visible to staff  - Apply yellow socks and bracelet for high fall risk patients  - Consider moving patient to room near nurses station  Outcome: Progressing     Problem: Prexisting or High Potential for Compromised Skin Integrity  Goal: Skin integrity is maintained or improved  Description: INTERVENTIONS:  - Identify patients at risk for skin breakdown  - Assess and monitor skin integrity  - Assess and monitor nutrition and hydration status  - Monitor labs   - Assess for incontinence   - Turn and reposition patient  - Assist with mobility/ambulation  - Relieve pressure over bony prominences  - Avoid friction and shearing  - Provide appropriate hygiene as needed including keeping skin clean and dry  - Evaluate need for skin moisturizer/barrier cream  - Collaborate with interdisciplinary team   - Patient/family teaching  - Consider wound care consult   Outcome: Progressing     Problem: PAIN - ADULT  Goal: Verbalizes/displays adequate comfort level or baseline comfort level  Description: Interventions:  - Encourage patient to monitor pain and request assistance  - Assess pain using appropriate pain scale  - Administer analgesics based on type and severity of pain and evaluate response  - Implement non-pharmacological measures as appropriate and evaluate response  - Consider cultural and social influences on pain and pain management  - Notify physician/advanced practitioner if interventions unsuccessful or patient reports  Bowel program  Adjust as needed  11/17   Patient on senna 1 tablet and MiraLAX.  Will increase bowel meds and monitor  11/18  suppository given  11/19 patient had BM.  Monitor  11/24: Continue current regimen   new pain  Outcome: Progressing     Problem: INFECTION - ADULT  Goal: Absence or prevention of progression during hospitalization  Description: INTERVENTIONS:  - Assess and monitor for signs and symptoms of infection  - Monitor lab/diagnostic results  - Monitor all insertion sites, i.e. indwelling lines, tubes, and drains  - Monitor endotracheal if appropriate and nasal secretions for changes in amount and color  - [place] appropriate cooling/warming therapies per order  - Administer medications as ordered  - Instruct and encourage patient and family to use good hand hygiene technique  - Identify and instruct in appropriate isolation precautions for identified infection/condition  Outcome: Progressing  Goal: Absence of fever/infection during neutropenic period  Description: INTERVENTIONS:  - Monitor WBC    Outcome: Progressing     Problem: SAFETY ADULT  Goal: Patient will remain free of falls  Description: INTERVENTIONS:  - Educate patient/family on patient safety including physical limitations  - Instruct patient to call for assistance with activity   - Consult OT/PT to assist with strengthening/mobility   - Keep Call bell within reach  - Keep bed low and locked with side rails adjusted as appropriate  - Keep care items and personal belongings within reach  - Initiate and maintain comfort rounds  - Make Fall Risk Sign visible to staff  - Apply yellow socks and bracelet for high fall risk patients  - Consider moving patient to room near nurses station  Outcome: Progressing  Goal: Maintain or return to baseline ADL function  Description: INTERVENTIONS:  -  Assess patient's ability to carry out ADLs; assess patient's baseline for ADL function and identify physical deficits which impact ability to perform ADLs (bathing, care of mouth/teeth, toileting, grooming, dressing, etc.)  - Assess/evaluate cause of self-care deficits   - Assess range of motion  - Assess patient's mobility; develop plan if impaired  - Assess  patient's need for assistive devices and provide as appropriate  - Encourage maximum independence but intervene and supervise when necessary  - Involve family in performance of ADLs  - Assess for home care needs following discharge   - Consider OT consult to assist with ADL evaluation and planning for discharge  - Provide patient education as appropriate  Outcome: Progressing  Goal: Maintains/Returns to pre admission functional level  Description: INTERVENTIONS:  - Perform AM-PAC 6 Click Basic Mobility/ Daily Activity assessment daily.  - Set and communicate daily mobility goal to care team and patient/family/caregiver.   - Collaborate with rehabilitation services on mobility goals if consulted  - Out of bed for toileting  - Record patient progress and toleration of activity level   Outcome: Progressing     Problem: DISCHARGE PLANNING  Goal: Discharge to home or other facility with appropriate resources  Description: INTERVENTIONS:  - Identify barriers to discharge w/patient and caregiver  - Arrange for needed discharge resources and transportation as appropriate  - Identify discharge learning needs (meds, wound care, etc.)  - Arrange for interpretive services to assist at discharge as needed  - Refer to Case Management Department for coordinating discharge planning if the patient needs post-hospital services based on physician/advanced practitioner order or complex needs related to functional status, cognitive ability, or social support system  Outcome: Progressing     Problem: Knowledge Deficit  Goal: Patient/family/caregiver demonstrates understanding of disease process, treatment plan, medications, and discharge instructions  Description: Complete learning assessment and assess knowledge base.  Interventions:  - Provide teaching at level of understanding  - Provide teaching via preferred learning methods  Outcome: Progressing     Problem: GENITOURINARY - ADULT  Goal: Maintains or returns to baseline urinary  function  Description: INTERVENTIONS:  - Assess urinary function  - Encourage oral fluids to ensure adequate hydration if ordered  - Administer IV fluids as ordered to ensure adequate hydration  - Administer ordered medications as needed  - Offer frequent toileting  - Follow urinary retention protocol if ordered  Outcome: Progressing  Goal: Absence of urinary retention  Description: INTERVENTIONS:  - Assess patient’s ability to void and empty bladder  - Monitor I/O  - Bladder scan as needed  - Discuss with physician/AP medications to alleviate retention as needed  - Discuss catheterization for long term situations as appropriate  Outcome: Progressing  Goal: Urinary catheter remains patent  Description: INTERVENTIONS:  - Assess patency of urinary catheter  - If patient has a chronic aviles, consider changing catheter if non-functioning  - Follow guidelines for intermittent irrigation of non-functioning urinary catheter  Outcome: Progressing     Problem: MUSCULOSKELETAL - ADULT  Goal: Maintain or return mobility to safest level of function  Description: INTERVENTIONS:  - Assess patient's ability to carry out ADLs; assess patient's baseline for ADL function and identify physical deficits which impact ability to perform ADLs (bathing, care of mouth/teeth, toileting, grooming, dressing, etc.)  - Assess/evaluate cause of self-care deficits   - Assess range of motion  - Assess patient's mobility  - Assess patient's need for assistive devices and provide as appropriate  - Encourage maximum independence but intervene and supervise when necessary  - Involve family in performance of ADLs  - Assess for home care needs following discharge   - Consider OT consult to assist with ADL evaluation and planning for discharge  - Provide patient education as appropriate  Outcome: Progressing  Goal: Maintain proper alignment of affected body part  Description: INTERVENTIONS:  - Support, maintain and protect limb and body alignment  -  Provide patient/ family with appropriate education  Outcome: Progressing     Problem: Nutrition/Hydration-ADULT  Goal: Nutrient/Hydration intake appropriate for improving, restoring or maintaining nutritional needs  Description: Monitor and assess patient's nutrition/hydration status for malnutrition. Collaborate with interdisciplinary team and initiate plan and interventions as ordered.  Monitor patient's weight and dietary intake as ordered or per policy. Utilize nutrition screening tool and intervene as necessary. Determine patient's food preferences and provide high-protein, high-caloric foods as appropriate.     INTERVENTIONS:  - Monitor oral intake, urinary output, labs, and treatment plans  - Assess nutrition and hydration status and recommend course of action  - Evaluate amount of meals eaten  - Assist patient with eating if necessary   - Allow adequate time for meals  - Recommend/ encourage appropriate diets, oral nutritional supplements, and vitamin/mineral supplements  - Order, calculate, and assess calorie counts as needed  - Recommend, monitor, and adjust tube feedings and TPN/PPN based on assessed needs  - Assess need for intravenous fluids  - Provide specific nutrition/hydration education as appropriate  - Include patient/family/caregiver in decisions related to nutrition  Outcome: Progressing

## 2024-09-08 NOTE — ASSESSMENT & PLAN NOTE
Pressure controlled continue home meds except losartan as patient received IV contrast last night hold for at least 48 hours   Ambulatory

## 2024-09-08 NOTE — ASSESSMENT & PLAN NOTE
Lab Results   Component Value Date    HGBA1C 7.4 (H) 09/07/2024       Recent Labs     09/07/24  1135 09/07/24  1608 09/07/24 2047 09/08/24  0740   POCGLU 299* 281* 338* 226*         Blood Sugar Average: Last 72 hrs:  (P) 279.0233732645630089  Continue long-acting insulin 39 units every 12 hours  Sliding scale insulin due to elevated blood sugars add additional Humalog 5 units 3 times daily with meals  Diabetic diet  Before meals and at bedtime glucose checks

## 2024-09-08 NOTE — PLAN OF CARE
Problem: Potential for Falls  Goal: Patient will remain free of falls  Description: INTERVENTIONS:  - Educate patient/family on patient safety including physical limitations  - Instruct patient to call for assistance with activity   - Consult OT/PT to assist with strengthening/mobility   - Keep Call bell within reach  - Keep bed low and locked with side rails adjusted as appropriate  - Keep care items and personal belongings within reach  - Initiate and maintain comfort rounds  - Make Fall Risk Sign visible to staff  - Apply yellow socks and bracelet for high fall risk patients  - Consider moving patient to room near nurses station  Outcome: Progressing     Problem: Prexisting or High Potential for Compromised Skin Integrity  Goal: Skin integrity is maintained or improved  Description: INTERVENTIONS:  - Identify patients at risk for skin breakdown  - Assess and monitor skin integrity  - Assess and monitor nutrition and hydration status  - Monitor labs   - Assess for incontinence   - Turn and reposition patient  - Assist with mobility/ambulation  - Relieve pressure over bony prominences  - Avoid friction and shearing  - Provide appropriate hygiene as needed including keeping skin clean and dry  - Evaluate need for skin moisturizer/barrier cream  - Collaborate with interdisciplinary team   - Patient/family teaching  - Consider wound care consult   Outcome: Progressing     Problem: PAIN - ADULT  Goal: Verbalizes/displays adequate comfort level or baseline comfort level  Description: Interventions:  - Encourage patient to monitor pain and request assistance  - Assess pain using appropriate pain scale  - Administer analgesics based on type and severity of pain and evaluate response  - Implement non-pharmacological measures as appropriate and evaluate response  - Consider cultural and social influences on pain and pain management  - Notify physician/advanced practitioner if interventions unsuccessful or patient reports  new pain  Outcome: Progressing     Problem: INFECTION - ADULT  Goal: Absence or prevention of progression during hospitalization  Description: INTERVENTIONS:  - Assess and monitor for signs and symptoms of infection  - Monitor lab/diagnostic results  - Monitor all insertion sites, i.e. indwelling lines, tubes, and drains  - Monitor endotracheal if appropriate and nasal secretions for changes in amount and color  - [place] appropriate cooling/warming therapies per order  - Administer medications as ordered  - Instruct and encourage patient and family to use good hand hygiene technique  - Identify and instruct in appropriate isolation precautions for identified infection/condition  Outcome: Progressing  Goal: Absence of fever/infection during neutropenic period  Description: INTERVENTIONS:  - Monitor WBC    Outcome: Progressing     Problem: SAFETY ADULT  Goal: Patient will remain free of falls  Description: INTERVENTIONS:  - Educate patient/family on patient safety including physical limitations  - Instruct patient to call for assistance with activity   - Consult OT/PT to assist with strengthening/mobility   - Keep Call bell within reach  - Keep bed low and locked with side rails adjusted as appropriate  - Keep care items and personal belongings within reach  - Initiate and maintain comfort rounds  - Make Fall Risk Sign visible to staff  - Offer Toileting every 2 Hours, in advance of need  - Initiate/Maintain bed alarm  - Obtain necessary fall risk management equipment  - Apply yellow socks and bracelet for high fall risk patients  - Consider moving patient to room near nurses station  Outcome: Progressing  Goal: Maintain or return to baseline ADL function  Description: INTERVENTIONS:  -  Assess patient's ability to carry out ADLs; assess patient's baseline for ADL function and identify physical deficits which impact ability to perform ADLs (bathing, care of mouth/teeth, toileting, grooming, dressing, etc.)  -  Assess/evaluate cause of self-care deficits   - Assess range of motion  - Assess patient's mobility; develop plan if impaired  - Assess patient's need for assistive devices and provide as appropriate  - Encourage maximum independence but intervene and supervise when necessary  - Involve family in performance of ADLs  - Assess for home care needs following discharge   - Consider OT consult to assist with ADL evaluation and planning for discharge  - Provide patient education as appropriate  Outcome: Progressing  Goal: Maintains/Returns to pre admission functional level  Description: INTERVENTIONS:  - Perform AM-PAC 6 Click Basic Mobility/ Daily Activity assessment daily.  - Set and communicate daily mobility goal to care team and patient/family/caregiver.   - Collaborate with rehabilitation services on mobility goals if consulted  - Reposition patient every 2 hours.  - Stand patient 3 times a day  - Ambulate patient 3 times a day  - Out of bed to chair 3 times a day   - Out of bed for meals 3 times a day  - Out of bed for toileting  - Record patient progress and toleration of activity level   Outcome: Progressing     Problem: DISCHARGE PLANNING  Goal: Discharge to home or other facility with appropriate resources  Description: INTERVENTIONS:  - Identify barriers to discharge w/patient and caregiver  - Arrange for needed discharge resources and transportation as appropriate  - Identify discharge learning needs (meds, wound care, etc.)  - Arrange for interpretive services to assist at discharge as needed  - Refer to Case Management Department for coordinating discharge planning if the patient needs post-hospital services based on physician/advanced practitioner order or complex needs related to functional status, cognitive ability, or social support system  Outcome: Progressing     Problem: Knowledge Deficit  Goal: Patient/family/caregiver demonstrates understanding of disease process, treatment plan, medications, and  discharge instructions  Description: Complete learning assessment and assess knowledge base.  Interventions:  - Provide teaching at level of understanding  - Provide teaching via preferred learning methods  Outcome: Progressing     Problem: GENITOURINARY - ADULT  Goal: Maintains or returns to baseline urinary function  Description: INTERVENTIONS:  - Assess urinary function  - Encourage oral fluids to ensure adequate hydration if ordered  - Administer IV fluids as ordered to ensure adequate hydration  - Administer ordered medications as needed  - Offer frequent toileting  - Follow urinary retention protocol if ordered  Outcome: Progressing  Goal: Absence of urinary retention  Description: INTERVENTIONS:  - Assess patient’s ability to void and empty bladder  - Monitor I/O  - Bladder scan as needed  - Discuss with physician/AP medications to alleviate retention as needed  - Discuss catheterization for long term situations as appropriate  Outcome: Progressing  Goal: Urinary catheter remains patent  Description: INTERVENTIONS:  - Assess patency of urinary catheter  - If patient has a chronic aviles, consider changing catheter if non-functioning  - Follow guidelines for intermittent irrigation of non-functioning urinary catheter  Outcome: Progressing     Problem: MUSCULOSKELETAL - ADULT  Goal: Maintain or return mobility to safest level of function  Description: INTERVENTIONS:  - Assess patient's ability to carry out ADLs; assess patient's baseline for ADL function and identify physical deficits which impact ability to perform ADLs (bathing, care of mouth/teeth, toileting, grooming, dressing, etc.)  - Assess/evaluate cause of self-care deficits   - Assess range of motion  - Assess patient's mobility  - Assess patient's need for assistive devices and provide as appropriate  - Encourage maximum independence but intervene and supervise when necessary  - Involve family in performance of ADLs  - Assess for home care needs  following discharge   - Consider OT consult to assist with ADL evaluation and planning for discharge  - Provide patient education as appropriate  Outcome: Progressing  Goal: Maintain proper alignment of affected body part  Description: INTERVENTIONS:  - Support, maintain and protect limb and body alignment  - Provide patient/ family with appropriate education  Outcome: Progressing     Problem: Nutrition/Hydration-ADULT  Goal: Nutrient/Hydration intake appropriate for improving, restoring or maintaining nutritional needs  Description: Monitor and assess patient's nutrition/hydration status for malnutrition. Collaborate with interdisciplinary team and initiate plan and interventions as ordered.  Monitor patient's weight and dietary intake as ordered or per policy. Utilize nutrition screening tool and intervene as necessary. Determine patient's food preferences and provide high-protein, high-caloric foods as appropriate.     INTERVENTIONS:  - Monitor oral intake, urinary output, labs, and treatment plans  - Assess nutrition and hydration status and recommend course of action  - Evaluate amount of meals eaten  - Assist patient with eating if necessary   - Allow adequate time for meals  - Recommend/ encourage appropriate diets, oral nutritional supplements, and vitamin/mineral supplements  - Order, calculate, and assess calorie counts as needed  - Recommend, monitor, and adjust tube feedings and TPN/PPN based on assessed needs  - Assess need for intravenous fluids  - Provide specific nutrition/hydration education as appropriate  - Include patient/family/caregiver in decisions related to nutrition  Outcome: Progressing

## 2024-09-08 NOTE — PLAN OF CARE
Problem: PHYSICAL THERAPY ADULT  Goal: Performs mobility at highest level of function for planned discharge setting.  See evaluation for individualized goals.  Description: Treatment/Interventions: ADL retraining, Functional transfer training, LE strengthening/ROM, Elevations, Therapeutic exercise, Endurance training, Patient/family training, Equipment eval/education, Bed mobility, Gait training, Compensatory technique education, Spoke to nursing, Spoke to case management, OT  Equipment Recommended:  (pt has cane and walker to use prn. discussed use of cane espeically when stiff when first completing mobility. verbalized understanding)       See flowsheet documentation for full assessment, interventions and recommendations.  Note: Prognosis: Good  Problem List: Decreased strength, Decreased endurance, Impaired balance, Decreased mobility, Decreased safety awareness, Impaired sensation, Obesity, Pain  Assessment: Pt is a 69 y.o. male seen for PT evaluation s/p admission to [facility] on 9/6/2024 with Pyelonephritis.  Order placed for PT services.  Upon evaluation: Pt is presenting with impaired functional mobility due to pain, decreased strength, decreased endurance, impaired balance, gait deviations, decreased safety awareness, and fall risk requiring  stand by to steadying assistance for transfers and steadying to stand by assistance for ambulation with out AD . Pt's clinical presentation is currently evolving given the functional mobility deficits above, especially weakness, decreased endurance, impaired balance, gait deviations, pain, decreased activity tolerance, decreased functional mobility tolerance, altered sensation, and decreased safety awareness, coupled with fall risks as indicated by AM-PAC 6-Clicks: 18/24 as well as impaired balance, polypharmacy, decreased safety awareness, limited sensation/neuropathy, and obesity and combined with medical complications of pain impacting overall  mobility status, abnormal CBC, abnormal blood sugars, abnormal potassium values, and pyelonephritis .  Pt's PMHx and comorbidities that may affect physical performance and progress include: A fib, CHF, DM, HTN, seizure disorder, obesity, neuropathy, and gout . Personal factors affecting pt at time of IE include: step(s) to enter environment, inability to perform IADLs, inability to perform ADLs, and inability to navigate community distances. Pt will benefit from continued skilled PT services to address deficits as defined above and to maximize level of functional mobility to facilitate return toward PLOF and improved QOL. From PT/mobility standpoint, recommendation at time of d/c would be Level III (Minimum Resource Intensity), with family and/or caregiver support, and with cane in order to reduce fall risk and maximize pt's functional independence and consistency with mobility. Recommend trial with cane next 1-2 sessions and ther ex next 1-2 sessions.  Barriers to Discharge: None  Barriers to Discharge Comments: pt has support and assistance from spouse prn  Rehab Resource Intensity Level, PT: III (Minimum Resource Intensity) (handout provided for OPPT services. pt aware of freedom of choice.)    See flowsheet documentation for full assessment.

## 2024-09-08 NOTE — TELEPHONE ENCOUNTER
Pranav Vizcaino  Is a 69-year-old male seen in urologic consultation for BPH and UTI.  Will require nonurgent follow-up with our service to establish urologic care for BPH/men's health.

## 2024-09-08 NOTE — PHYSICAL THERAPY NOTE
"   PHYSICAL THERAPY EVALUATION  NAME:  Pranav Vizcaino  DATE: 09/08/24    AGE:   69 y.o.  Mrn:   [id number]  ADMIT DX:  Pyelonephritis [N12]  UTI symptoms [R39.9]    Past Medical History:   Diagnosis Date    A-fib (HCC)     CHF (congestive heart failure) (HCC)     Diabetes mellitus (HCC)     Gout     High blood pressure      Length Of Stay: 2  Performed at least 2 patient identifiers during session: Name and Birthday  PHYSICAL THERAPY EVALUATION :    09/08/24 1545   PT Last Visit   PT Visit Date 09/08/24   Note Type   Note type Evaluation   Pain Assessment   Pain Assessment Tool 0-10   Pain Score 8  (with mobility)   Pain Location/Orientation Orientation: Right  (flank)   Restrictions/Precautions   Other Precautions Chair Alarm;Fall Risk;Pain   Home Living   Type of Home House  (2 FARIBA B HRs)   Home Layout One level;Performs ADLs on one level;Able to live on main level with bedroom/bathroom   Bathroom Shower/Tub Walk-in shower   Bathroom Toilet Raised   Bathroom Equipment Built-in shower seat;Grab bars in shower   Home Equipment Walker;Cane  (wasn't using an AD PTA.)   Additional Comments Reports living in a 1SH with 2 FARIBA B HRs and not using an AD PTA.   Prior Function   Level of [place] Independent with ADLs;Independent with functional mobility;Needs assistance with IADLS  (assist to don/doff socks/shoes)   Lives With Spouse   Receives Help From Family   IADLs Family/Friend/Other provides transportation;Independent with meal prep;Family/Friend/Other provides medication management   Falls in the last 6 months 0   Comments Reports beingindependent with mobility, ADLs except socks and shoes, and has assistance with medications and transportation, but can prepare meals.   General   Family/Caregiver Present Yes  (spouse, Caroline, patient's daughter and son in law)   Cognition   Orientation Level Oriented X4   Following Commands Follows one step commands without difficulty   Subjective   Subjective \"I am stiff.\"   RLE " Assessment   RLE Assessment WFL  (4/5)   LLE Assessment   LLE Assessment WFL  (4/5)   Coordination   Rapid Alternating Movements Intact   Light Touch   RLE Light Touch Impaired   RLE Light Touch Comments diminished distally, but appears absent right ankle and down   LLE Light Touch Impaired   LLE Light Touch Comments diminsihed distally   Bed Mobility   Additional Comments Pt sitting OOB in recliner chair at start and end of session, all needs within reach.   Transfers   Sit to Stand   (SBA)   Additional items Increased time required;Verbal cues   Stand to Sit   (SBA)   Additional items Verbal cues;Impulsive  (verbal cues for controlled descent)   Stand pivot   (steadying--->SBA)   Additional items Assist x 1;Increased time required;Verbal cues   Additional Comments no AD. sit<>stand without AD with inc time and effort with verbal cues for anteiror wt shifting and LE placement. SBA for sit<>stand. spt with steadying asisstance intiially for balance, pt wiht increased stiffness, progressing to SBA. wide THU throughout.   Ambulation/Elevation   Gait pattern Wide THU;Improper Weight shift;Forward Flexion;Short stride;Excessively slow;Step through pattern;Decreased foot clearance   Gait Assistance   (steadying assistance-->SBA)   Additional items Assist x 1;Verbal cues   Assistive Device None  (pt declined trial with AD)   Distance ambulated 115'x1 wit steadying-->SBA and 125'x1 and 12'x1 without AD with SBA with slow enmanuel, wide THU, decreased step length and foot clearance and increasing trunk flexion with anterior lean. pt reporting difficulty standing upright due to pain, forward head, rounded shoulders noted.   Stair Management Assistance   (SBA)   Additional items Verbal cues;Increased time required   Stair Management Technique Two rails;One rail L;Step to pattern;Foreward;Sideways   Number of Stairs 6   Ambulation/Elevation Additional Comments ascended 4 steps with B HRs with SBA step to pattern up right LE.  "descended 2 steps B HRs leading with left LE. cues for B hands on right HR to descend other 2 steps then completed 2 steps ascending with B hands on left HR leading up with right LE and descended 2 steps backward. SBA for safety.   Balance   Static Sitting Good   Dynamic Sitting Fair +   Static Standing Fair   Dynamic Standing Fair -   Ambulatory Fair -   Activity Tolerance   Activity Tolerance Patient limited by fatigue;Patient limited by pain   Nurse Made Aware Clarissa BRITTON   Assessment   Prognosis Good   Problem List Decreased strength;Decreased endurance;Impaired balance;Decreased mobility;Decreased safety awareness;Impaired sensation;Obesity;Pain   Barriers to Discharge None   Barriers to Discharge Comments pt has support and assistance from spouse prn   Goals   Patient Goals \"Go home\"   STG Expiration Date 09/22/24   PT Treatment Day 0   Plan   Treatment/Interventions ADL retraining;Functional transfer training;LE strengthening/ROM;Elevations;Therapeutic exercise;Endurance training;Patient/family training;Equipment eval/education;Bed mobility;Gait training;Compensatory technique education;Spoke to nursing;Spoke to case management;OT   PT Frequency 1-2x/wk   Discharge Recommendation   Rehab Resource Intensity Level, PT III (Minimum Resource Intensity)  (handout provided for OPPT services. pt aware of freedom of choice.)   Equipment Recommended   (pt has cane and walker to use prn. discussed use of cane espeically when stiff when first completing mobility. verbalized understanding)   Additional Comments recommend increased support and assistance from family prn   AM-PAC Basic Mobility Inpatient   Turning in Flat Bed Without Bedrails 3   Lying on Back to Sitting on Edge of Flat Bed Without Bedrails 3   Moving Bed to Chair 3   Standing Up From Chair Using Arms 3   Walk in Room 3   Climb 3-5 Stairs With Railing 3   Basic Mobility Inpatient Raw Score 18   Basic Mobility Standardized Score 41.05   [facility] " Level Of Mobility   -HLM Goal 6: Walk 10 steps or more   JH-HLM Achieved 8: Walk 250 feet ot more   End of Consult   Patient Position at End of Consult Bedside chair;Bed/Chair alarm activated;All needs within reach         (Please find full objective findings from PT assessment regarding body systems outlined above).     Assessment: Pt is a 69 y.o. male seen for PT evaluation s/p admission to [facility] on 9/6/2024 with Pyelonephritis.  Order placed for PT services.  Upon evaluation: Pt is presenting with impaired functional mobility due to pain, decreased strength, decreased endurance, impaired balance, gait deviations, decreased safety awareness, and fall risk requiring  stand by to steadying assistance for transfers and steadying to stand by assistance for ambulation with out AD . Pt's clinical presentation is currently evolving given the functional mobility deficits above, especially weakness, decreased endurance, impaired balance, gait deviations, pain, decreased activity tolerance, decreased functional mobility tolerance, altered sensation, and decreased safety awareness, coupled with fall risks as indicated by AM-PAC 6-Clicks: 18/24 as well as impaired balance, polypharmacy, decreased safety awareness, limited sensation/neuropathy, and obesity and combined with medical complications of pain impacting overall mobility status, abnormal CBC, abnormal blood sugars, abnormal potassium values, and pyelonephritis .  Pt's PMHx and comorbidities that may affect physical performance and progress include: A fib, CHF, DM, HTN, seizure disorder, obesity, neuropathy, and gout . Personal factors affecting pt at time of IE include: step(s) to enter environment, inability to perform IADLs, inability to perform ADLs, and inability to navigate community distances. Pt will benefit from continued skilled PT services to address deficits as defined above and to maximize level of functional mobility to  facilitate return toward PLOF and improved QOL. From PT/mobility standpoint, recommendation at time of d/c would be Level III (Minimum Resource Intensity), with family and/or caregiver support, and with cane in order to reduce fall risk and maximize pt's functional independence and consistency with mobility. Recommend trial with cane next 1-2 sessions and ther ex next 1-2 sessions.       The patient's AM-PAC Basic Mobility Inpatient Short Form Raw Score is 18. A Raw score of greater than 16 suggests the patient may benefit from discharge to home. Please also refer to the recommendation of the Physical Therapist for safe discharge planning.       Goals: Pt will: Perform bed mobility tasks with modified Independent to reposition in bed and prepare for transfers. Pt will perform transfers with modified Independent to decrease burden of care, decrease risk for falls, and improve activity tolerance and prepare for ambulation. Pt will ambulate with LRAD for >/= 250' with  modified Independent  to decrease burden of care, decrease risk for falls, improve activity tolerance, and improve gait quality and to access home environment. Pt will complete 1 step with LRAD and >/= 8 steps with unilateral handrail with modified Independent to decrease burden of care, return to home with FARIBA, decrease risk for falls, and improve activity tolerance. Pt will participate in objective balance assessment to determine baseline fall risk. Pt will participate in SSWS assessment to determine level of mobility. Pt will increase B LE strength >/= 1/2 MMT grade to facilitate functional mobility.      Sylvie Guadalupe, PT,DPT

## 2024-09-08 NOTE — PROGRESS NOTES
[facility]  Progress Note  Name: Pranav Vizcaino I  MRN: [id number]  Unit/Bed#: -01 I Date of Admission: 9/6/2024   Date of Service: 9/8/2024 I Hospital Day: 2    Assessment & Plan   * Pyelonephritis  Assessment & Plan  Patient is a 69-year-old male with a past medical history of hypertension, hyperlipidemia, diabetes, morbid obesity, seizure disorder, and CHF who presents to the hospital with urinary complaints.  He reports that he has had weakness dark urine and fever for the last few days, he went to urgent care who told him he may have sepsis and that he should come to the emergency room.  In the emergency room he not found to have sepsis however his urinalysis suggest infection and CAT scan suggest pyelonephritis.  He will be admitted to the hospital and treated for acute pyelonephritis.  Continue ceftriaxone   urine culture mixed contaminant.  Blood culture pending at this time.  Dysuria improving with Pyridium but still has bilateral flank pain.    History of atrial fibrillation  Assessment & Plan  Currently in sinus rhythm  Continue Eliquis  Continue beta-blocker    Chronic diastolic CHF (congestive heart failure) ([facility])  Assessment & Plan  Wt Readings from Last 3 Encounters:   09/07/24 (!) 159 kg (351 lb)   09/06/24 (!) 158 kg (349 lb)   08/17/24 (!) 166 kg (365 lb)     Daily weights  Continue Lasix  Continue beta-blocker  Currently euvolemic          Seizure disorder ([facility])  Assessment & Plan  Continue home dose Keppra twice daily  Continues on Zonisamide    Mixed hyperlipidemia  Assessment & Plan  Cont lipitor    Morbid obesity due to excess calories ([facility])  Assessment & Plan  bMI is 43.87.  Needs diet exercise and lifestyle modification counseling    Type 2 diabetes mellitus without complication, with long-term current use of insulin ([facility])  Assessment & Plan  Lab Results   Component Value Date    HGBA1C 7.4 (H) 09/07/2024       Recent Labs     09/07/24  1135 09/07/24  1608  09/07/24 2047 09/08/24  0740   POCGLU 299* 281* 338* 226*         Blood Sugar Average: Last 72 hrs:  (P) [id number]  Continue long-acting insulin 39 units every 12 hours  Sliding scale insulin due to elevated blood sugars add additional Humalog 5 units 3 times daily with meals  Diabetic diet  Before meals and at bedtime glucose checks    Benign essential HTN  Assessment & Plan  Pressure controlled continue home meds except losartan as patient received IV contrast last night hold for at least 48 hours               VTE Pharmacologic Prophylaxis: VTE Score: 13 Moderate Risk (Score 3-4) - Pharmacological DVT Prophylaxis Ordered: apixaban (Eliquis).    Mobility:   Basic Mobility Inpatient Raw Score: 22  JH-HLM Goal: 7: Walk 25 feet or more  JH-HLM Achieved: 7: Walk 25 feet or more  JH-HLM Goal achieved. Continue to encourage appropriate mobility.    Patient Centered Rounds: I performed bedside rounds with nursing staff today.   Discussions with Specialists or Other Care Team Provider: pyelonephritis    Education and Discussions with Family / Patient: will update    Total Time Spent on Date of Encounter in care of patient: 35 mins. This time was spent on one or more of the following: performing physical exam; counseling and coordination of care; obtaining or reviewing history; documenting in the medical record; reviewing/ordering tests, medications or procedures; communicating with other healthcare professionals and discussing with patient's family/caregivers.    Current Length of Stay: 2 day(s)  Current Patient Status: Inpatient   Certification Statement: The patient will continue to require additional inpatient hospital stay due to acute pyelonephritis  Discharge Plan: Anticipate discharge in 24-48 hrs to discharge location to be determined pending rehab evaluations.    Code Status: Prior    Subjective:   Patient complaining of bilateral flank pain.  Dysuria is improving.  Denies any chest pain or shortness  of breath    Objective:     Vitals:   Temp (24hrs), Av.6 °F (36.4 °C), Min:97.5 °F (36.4 °C), Max:97.7 °F (36.5 °C)    Temp:  [97.5 °F (36.4 °C)-97.7 °F (36.5 °C)] 97.5 °F (36.4 °C)  HR:  [81-89] 81  Resp:  [18] 18  BP: (143-149)/(77-83) 149/83  SpO2:  [95 %-98 %] 96 %  Body mass index is 43.87 kg/m².     Input and Output Summary (last 24 hours):     Intake/Output Summary (Last 24 hours) at 2024 0941  Last data filed at 2024 0751  Gross per 24 hour   Intake 480 ml   Output 2050 ml   Net -1570 ml       Physical Exam:   Physical Exam  Vitals and nursing note reviewed.   Constitutional:       Appearance: He is ill-appearing.   HENT:      Head: Normocephalic and atraumatic.      Right Ear: External ear normal.      Left Ear: External ear normal.      Nose: Nose normal.      Mouth/Throat:      Pharynx: Oropharynx is clear.   Eyes:      Pupils: Pupils are equal, round, and reactive to light.   Cardiovascular:      Rate and Rhythm: Normal rate and regular rhythm.      Heart sounds: Normal heart sounds.   Pulmonary:      Effort: Pulmonary effort is normal.      Breath sounds: Normal breath sounds.   Abdominal:      General: Bowel sounds are normal.      Palpations: Abdomen is soft.      Tenderness: There is no abdominal tenderness. There is right CVA tenderness and left CVA tenderness.   Musculoskeletal:         General: Normal range of motion.      Cervical back: Normal range of motion and neck supple.   Skin:     General: Skin is warm and dry.      Capillary Refill: Capillary refill takes less than 2 seconds.   Neurological:      General: No focal deficit present.      Mental Status: He is alert and oriented to person, place, and time.   Psychiatric:         Mood and Affect: Mood normal.            Additional Data:     Labs:  Results from last 7 days   Lab Units 24  0452 24  1859   WBC Thousand/uL 6.42 11.61*   HEMOGLOBIN g/dL 12.2 13.6   HEMATOCRIT % 35.1* 38.5   PLATELETS Thousands/uL 145* 147*    SEGS PCT %  --  83*   LYMPHO PCT %  --  7*   MONO PCT %  --  8   EOS PCT %  --  1     Results from last 7 days   Lab Units 09/08/24  0452 09/06/24  1859   SODIUM mmol/L 139 136   POTASSIUM mmol/L 3.3* 3.9   CHLORIDE mmol/L 109* 106   CO2 mmol/L 23 24   BUN mg/dL 14 15   CREATININE mg/dL 1.08 1.19   ANION GAP mmol/L 7 6   CALCIUM mg/dL 9.4 9.8   ALBUMIN g/dL  --  4.0   TOTAL BILIRUBIN mg/dL  --  1.92*   ALK PHOS U/L  --  103   ALT U/L  --  22   AST U/L  --  16   GLUCOSE RANDOM mg/dL 233* 295*     Results from last 7 days   Lab Units 09/06/24  1859   INR  1.34*     Results from last 7 days   Lab Units 09/08/24  0740 09/07/24  2047 09/07/24  1608 09/07/24  1135 09/07/24  0718 09/06/24  2351 09/06/24  1844 09/06/24  1654   POC GLUCOSE mg/dl 226* 338* 281* 299* 260* 241* 311* 288*     Results from last 7 days   Lab Units 09/07/24  0508 09/06/24  1859   HEMOGLOBIN A1C % 7.4* 7.2*     Results from last 7 days   Lab Units 09/06/24  1859   LACTIC ACID mmol/L 1.8   PROCALCITONIN ng/ml 0.24       Lines/Drains:  Invasive Devices       Peripheral Intravenous Line  Duration             Peripheral IV 09/06/24 Dorsal (posterior);Right Wrist 1 day                          Imaging: Reviewed radiology reports from this admission including: abdominal/pelvic CT    Recent Cultures (last 7 days):   Results from last 7 days   Lab Units 09/06/24  1859 09/06/24  1701   BLOOD CULTURE  Received in Microbiology Lab. Culture in Progress.  --    URINE CULTURE   --  <10,000 cfu/ml       Last 24 Hours Medication List:   Current Facility-Administered Medications   Medication Dose Route Frequency Provider Last Rate    allopurinol  300 mg Oral Daily RIA Mullen      apixaban  5 mg Oral BID RIA Mullen      aspirin  81 mg Oral Daily RIA Mullen      atorvastatin  40 mg Oral After Dinner RIA Mullen      cefTRIAXone  2,000 mg Intravenous Q24H RIA Mullen 2,000 mg  (09/07/24 5609)    furosemide  80 mg Oral Daily RIA Mullen      insulin glargine  39 Units Subcutaneous Q12H SHANA RIA Mullen      insulin lispro  1-6 Units Subcutaneous HS Mary Lou Geller MD      insulin lispro  2-12 Units Subcutaneous TID AC Mary Lou Geller MD      insulin lispro  5 Units Subcutaneous TID With Meals Mary Lou Geller MD      levETIRAcetam  1,750 mg Oral Q12H [facility] RIA Mullen      levothyroxine  300 mcg Oral Once per day on Monday Tuesday Wednesday Thursday Friday Saturday RIA Mullen      metoprolol succinate  100 mg Oral Daily RIA Mullen      montelukast  10 mg Oral Daily RIA Mullen      pantoprazole  40 mg Oral Daily Before Breakfast RIA Mullen      phenazopyridine  100 mg Oral TID With Meals Mary oLu Geller MD          Today, Patient Was Seen By: Mary Lou Geller MD    **Please Note: This note may have been constructed using a voice recognition system.**

## 2024-09-08 NOTE — CONSULTS
Consultation - Urology   Pranav Vizcaino 69 y.o. male MRN: [id number]  Unit/Bed#: -01 Encounter: [id number]      Assessment & Plan      Assessment:  69-year-old male with urinary hesitancy and pain consulted urology for pyelonephritis.  Prior urgent care visit showed hematuria and trace ketones.  Patient afebrile, normotensive and regular rate.  Initial leukocytosis of 11.61 and UA showing glucose, ketones, blood, leukocytes, and WBC. CT scan showing left greater than right perinephric stranding along with a nonobstructing intrarenal stone that does not require treatment.     Plan:  -Continue empiric antibiotic and symptom management  -Continue glycemic control  -Pain and nausea control as needed  -Monitor voiding and I's and O's  -Monitor vital signs  -Trend labs  -Eventual workup for BPH/mental health  -Remainder of care per primary team    History of Present Illness   Attending: Mary Lou Geller MD  Reason for Consult / Principal Problem: Acute Pyelonephritis  HPI: Pranav Vizcaino is a 69 y.o. year old male who presents with urinary hesitancy and pain.  Patient has a past medical history of A-fib on anticoagulation, CHF, diabetes, gout, hypertension, and obesity.  Patient began feeling symptoms x 2 days ago with increased pain with urination.  Patient described the pain as a sharp pain in the suprapubic region that radiated to his kidneys. The patient states the pain is worse with movement and turning on his sides in bed.  Patient states he noticed darkening of his urine from pale yellow to dark orange.  He did not see any blood or blood particulate in urine.  Patient went to urgent care where they performed a UA that showed blood and trace ketones in urine.  He denies any fever or chills.  Patient endorsed episode of vomiting and nausea following the symptoms.  Patient denied any chest pain, shortness of breath, visual changes, or confusion.    Inpatient consult to Urology  Consult performed by: Hu Mendoza  PA-C  Consult ordered by: Mary Lou Geller MD        Review of Systems   Constitutional:  Negative for activity change, appetite change, chills, diaphoresis and fever.   Cardiovascular:  Negative for chest pain, palpitations and leg swelling.   Gastrointestinal:  Positive for diarrhea, nausea and vomiting. Negative for abdominal distention, abdominal pain and constipation.   Genitourinary:  Positive for difficulty urinating. Negative for hematuria, penile pain and testicular pain.   Skin:  Negative for color change and pallor.   Neurological:  Negative for dizziness, syncope, weakness and numbness.       Historical Information   Past Medical History:   Diagnosis Date    A-fib (HCC)     CHF (congestive heart failure) (HCC)     Diabetes mellitus (HCC)     Gout     High blood pressure      Past Surgical History:   Procedure Laterality Date    BRAIN TUMOR EXCISION      CHOLECYSTECTOMY      KIDNEY STONE SURGERY      KNEE SURGERY       Social History   Social History     Substance and Sexual Activity   Alcohol Use Yes    Comment: rarely     @DRUGHX  E-Cigarette/Vaping    E-Cigarette Use Never User      E-Cigarette/Vaping Substances     Social History     Tobacco Use   Smoking Status Never   Smokeless Tobacco Never     Family History:   Family History   Problem Relation Age of Onset    Cancer Mother     Cancer Father        Meds/Allergies   all current active meds have been reviewed, current meds:   Current Facility-Administered Medications   Medication Dose Route Frequency    allopurinol (ZYLOPRIM) tablet 300 mg  300 mg Oral Daily    apixaban (ELIQUIS) tablet 5 mg  5 mg Oral BID    aspirin (ECOTRIN LOW STRENGTH) EC tablet 81 mg  81 mg Oral Daily    atorvastatin (LIPITOR) tablet 40 mg  40 mg Oral After Dinner    cefTRIAXone (ROCEPHIN) IVPB (premix in dextrose) 2,000 mg 50 mL  2,000 mg Intravenous Q24H    furosemide (LASIX) tablet 80 mg  80 mg Oral Daily    insulin glargine (LANTUS) subcutaneous injection 39 Units 0.39 mL  39  Units Subcutaneous Q12H SHANA    insulin lispro (HumALOG/ADMELOG) 100 units/mL subcutaneous injection 1-6 Units  1-6 Units Subcutaneous HS    insulin lispro (HumALOG/ADMELOG) 100 units/mL subcutaneous injection 2-12 Units  2-12 Units Subcutaneous TID AC    insulin lispro (HumALOG/ADMELOG) 100 units/mL subcutaneous injection 5 Units  5 Units Subcutaneous TID With Meals    levETIRAcetam (KEPPRA) tablet 1,750 mg  1,750 mg Oral Q12H SHANA    levothyroxine tablet 300 mcg  300 mcg Oral Once per day on     metoprolol succinate (TOPROL-XL) 24 hr tablet 100 mg  100 mg Oral Daily    montelukast (SINGULAIR) tablet 10 mg  10 mg Oral Daily    pantoprazole (PROTONIX) EC tablet 40 mg  40 mg Oral Daily Before Breakfast    phenazopyridine (PYRIDIUM) tablet 100 mg  100 mg Oral TID With Meals   , and PTA meds:   Prior to Admission Medications   Prescriptions Last Dose Informant Patient Reported? Taking?   ALLOPURINOL PO 2024 Self Yes Yes   Sig: Take 300 mg by mouth daily    Admelog SoloStar 100 units/mL injection pen 2024  Yes Yes   Si Units 3 (three) times a day with meals   Atorvastatin Calcium (LIPITOR PO) 2024  Yes Yes   Sig: Take 40 mg by mouth daily    Furosemide (LASIX PO) 2024  Yes Yes   Sig: Take 80 mg by mouth daily    Potassium (POTASSIMIN PO) 2024  Yes Yes   Sig: Take by mouth   acetaminophen (TYLENOL) 325 mg tablet   No No   Sig: Take 2 tablets (650 mg total) by mouth every 6 (six) hours as needed for mild pain   albuterol (PROVENTIL HFA,VENTOLIN HFA) 90 mcg/act inhaler   No No   Sig: Inhale 2 puffs every 4 (four) hours as needed for wheezing   Patient not taking: Reported on 2024   apixaban (ELIQUIS) 5 mg 2024  Yes Yes   Sig: Take 5 mg by mouth 2 (two) times a day   ascorbic acid (VITAMIN C) 1000 MG tablet 2024  No Yes   Sig: Take 1 tablet (1,000 mg total) by mouth daily for 12 doses   aspirin (ECOTRIN LOW STRENGTH) 81 mg EC tablet  "2024  Yes Yes   Sig: Take by mouth   cholecalciferol (VITAMIN D3) 1,000 units tablet 2024  No Yes   Sig: Take 2 tablets (2,000 Units total) by mouth daily for 12 days   insulin degludec (Tresiba FlexTouch) 100 units/mL injection pen 2024  No Yes   Sig: Inject 60 Units under the skin every 12 (twelve) hours   Patient taking differently: Inject 39 Units under the skin every 12 (twelve) hours   levETIRAcetam (KEPPRA) 250 mg tablet 2024  Yes Yes   Sig: Take 7 tablets by mouth every 12 (twelve) hours   levothyroxine (SYNTHROID) 300 MCG tablet 2024  Yes Yes   Sig: Take 300 mcg by mouth daily Mon to saturday   losartan (COZAAR) 50 mg tablet 2024  Yes Yes   Sig: Take 50 mg by mouth   metoprolol succinate (TOPROL-XL) 100 mg 24 hr tablet 2024  Yes Yes   Sig: Take 100 mg by mouth daily   montelukast (SINGULAIR) 10 mg tablet 2024  Yes Yes   Sig: Take 10 mg by mouth in the morning   pantoprazole (PROTONIX) 40 mg tablet 2024  Yes Yes   Sig: Take 40 mg by mouth   semaglutide, 1 mg/dose, (Ozempic, 1 MG/DOSE,) 4 mg/3 mL injection pen 2024  Yes Yes   Si mg   zinc sulfate (ZINCATE) 220 mg capsule   No No   Sig: Take 1 capsule (220 mg total) by mouth daily for 5 doses   Patient not taking: Reported on 2024   zonisamide (ZONEGRAN) 100 mg capsule 2024  Yes Yes   Sig: Take 200 mg by mouth daily      Facility-Administered Medications: None     Allergies   Allergen Reactions    Aspartame - Food Allergy Anaphylaxis     ALL ARTIFICIAL SWEETNERS    Bactrim [Sulfamethoxazole-Trimethoprim] Anaphylaxis    Trimethoprim Other (See Comments)    Adhesive  [Medical Tape] Rash     PAPER TAPE ONLY per paper       Objective   Vitals: Blood pressure 149/83, pulse 81, temperature 97.5 °F (36.4 °C), resp. rate 18, height 6' 3\" (1.905 m), weight (!) 159 kg (351 lb), SpO2 96%.    I/O last 24 hours:  In: 600 [P.O.:600]  Out:  [Urine:]    Invasive Devices       Peripheral Intravenous Line  Duration "             Peripheral IV 09/06/24 Dorsal (posterior);Right Wrist 1 day                    Physical Exam  Constitutional:       General: He is not in acute distress.     Appearance: Normal appearance. He is obese. He is not ill-appearing, toxic-appearing or diaphoretic.   Cardiovascular:      Rate and Rhythm: Normal rate and regular rhythm.      Pulses: Normal pulses.      Heart sounds: Normal heart sounds. No murmur heard.     No gallop.   Pulmonary:      Effort: Pulmonary effort is normal. No respiratory distress.      Breath sounds: Normal breath sounds. No wheezing.   Abdominal:      General: Abdomen is flat. Bowel sounds are normal. There is no distension.      Palpations: Abdomen is soft. There is no mass.      Tenderness: There is abdominal tenderness (Suprapubic tenderness to palpation.  No right or left CVA tenderness.). There is no guarding or rebound.      Hernia: No hernia is present.   Skin:     General: Skin is warm and dry.   Neurological:      General: No focal deficit present.      Mental Status: He is alert and oriented to person, place, and time.         Lab Results: I have personally reviewed pertinent reports.    CBC:   Lab Results   Component Value Date    WBC 6.42 09/08/2024    HGB 12.2 09/08/2024    HCT 35.1 (L) 09/08/2024    MCV 86 09/08/2024     (L) 09/08/2024    RBC 4.07 09/08/2024    MCH 30.0 09/08/2024    MCHC 34.8 09/08/2024    RDW 14.3 09/08/2024    MPV 10.4 09/08/2024     CMP:   Lab Results   Component Value Date    SODIUM 139 09/08/2024     (H) 09/08/2024    CO2 23 09/08/2024    BUN 14 09/08/2024    CREATININE 1.08 09/08/2024    CALCIUM 9.4 09/08/2024    EGFR 69 09/08/2024     Imaging Studies: I have personally reviewed pertinent reports.    EKG, Pathology, and Other Studies: I have personally reviewed pertinent reports.    VTE Prophylaxis: Sequential compression device (Venodyne)         Counseling / Coordination of Care  Total floor / unit time spent today 30 minutes.  Greater than 50% of total time was spent with the patient and / or family counseling and / or coordination of care. A description of the counseling / coordination of care.

## 2024-09-09 LAB
ALBUMIN SERPL BCG-MCNC: 3.5 G/DL (ref 3.5–5)
ALP SERPL-CCNC: 122 U/L (ref 34–104)
ALT SERPL W P-5'-P-CCNC: 51 U/L (ref 7–52)
ANION GAP SERPL CALCULATED.3IONS-SCNC: 7 MMOL/L (ref 4–13)
AST SERPL W P-5'-P-CCNC: 49 U/L (ref 13–39)
ATRIAL RATE: 105 BPM
BILIRUB SERPL-MCNC: 1.11 MG/DL (ref 0.2–1)
BUN SERPL-MCNC: 17 MG/DL (ref 5–25)
CALCIUM SERPL-MCNC: 9.5 MG/DL (ref 8.4–10.2)
CHLORIDE SERPL-SCNC: 106 MMOL/L (ref 96–108)
CO2 SERPL-SCNC: 25 MMOL/L (ref 21–32)
CREAT SERPL-MCNC: 1.1 MG/DL (ref 0.6–1.3)
ERYTHROCYTE [DISTWIDTH] IN BLOOD BY AUTOMATED COUNT: 14.3 % (ref 11.6–15.1)
GFR SERPL CREATININE-BSD FRML MDRD: 68 ML/MIN/1.73SQ M
GLUCOSE SERPL-MCNC: 159 MG/DL (ref 65–140)
GLUCOSE SERPL-MCNC: 175 MG/DL (ref 65–140)
GLUCOSE SERPL-MCNC: 210 MG/DL (ref 65–140)
GLUCOSE SERPL-MCNC: 214 MG/DL (ref 65–140)
GLUCOSE SERPL-MCNC: 241 MG/DL (ref 65–140)
HCT VFR BLD AUTO: 37.3 % (ref 36.5–49.3)
HGB BLD-MCNC: 13 G/DL (ref 12–17)
MCH RBC QN AUTO: 30.2 PG (ref 26.8–34.3)
MCHC RBC AUTO-ENTMCNC: 34.9 G/DL (ref 31.4–37.4)
MCV RBC AUTO: 87 FL (ref 82–98)
PLATELET # BLD AUTO: 161 THOUSANDS/UL (ref 149–390)
PMV BLD AUTO: 9.9 FL (ref 8.9–12.7)
POTASSIUM SERPL-SCNC: 3.5 MMOL/L (ref 3.5–5.3)
PR INTERVAL: 224 MS
PROT SERPL-MCNC: 6.3 G/DL (ref 6.4–8.4)
QRS AXIS: -63 DEGREES
QRSD INTERVAL: 118 MS
QT INTERVAL: 338 MS
QTC INTERVAL: 446 MS
RBC # BLD AUTO: 4.31 MILLION/UL (ref 3.88–5.62)
SODIUM SERPL-SCNC: 138 MMOL/L (ref 135–147)
T WAVE AXIS: 119 DEGREES
VENTRICULAR RATE: 105 BPM
WBC # BLD AUTO: 5.08 THOUSAND/UL (ref 4.31–10.16)

## 2024-09-09 PROCEDURE — 99232 SBSQ HOSP IP/OBS MODERATE 35: CPT | Performed by: FAMILY MEDICINE

## 2024-09-09 PROCEDURE — 82948 REAGENT STRIP/BLOOD GLUCOSE: CPT

## 2024-09-09 PROCEDURE — 80053 COMPREHEN METABOLIC PANEL: CPT | Performed by: FAMILY MEDICINE

## 2024-09-09 PROCEDURE — 93010 ELECTROCARDIOGRAM REPORT: CPT | Performed by: INTERNAL MEDICINE

## 2024-09-09 PROCEDURE — 85027 COMPLETE CBC AUTOMATED: CPT | Performed by: FAMILY MEDICINE

## 2024-09-09 RX ADMIN — APIXABAN 5 MG: 5 TABLET, FILM COATED ORAL at 17:20

## 2024-09-09 RX ADMIN — CEFTRIAXONE 2000 MG: 2 INJECTION, SOLUTION INTRAVENOUS at 22:12

## 2024-09-09 RX ADMIN — MONTELUKAST 10 MG: 10 TABLET, FILM COATED ORAL at 08:48

## 2024-09-09 RX ADMIN — PHENAZOPYRIDINE 100 MG: 100 TABLET ORAL at 11:54

## 2024-09-09 RX ADMIN — INSULIN LISPRO 2 UNITS: 100 INJECTION, SOLUTION INTRAVENOUS; SUBCUTANEOUS at 21:42

## 2024-09-09 RX ADMIN — ACETAMINOPHEN 325MG 650 MG: 325 TABLET ORAL at 11:54

## 2024-09-09 RX ADMIN — LEVOTHYROXINE SODIUM 300 MCG: 150 TABLET ORAL at 05:03

## 2024-09-09 RX ADMIN — PHENAZOPYRIDINE 100 MG: 100 TABLET ORAL at 17:20

## 2024-09-09 RX ADMIN — LEVETIRACETAM 1750 MG: 500 TABLET, FILM COATED ORAL at 21:42

## 2024-09-09 RX ADMIN — INSULIN LISPRO 5 UNITS: 100 INJECTION, SOLUTION INTRAVENOUS; SUBCUTANEOUS at 08:50

## 2024-09-09 RX ADMIN — INSULIN GLARGINE 39 UNITS: 100 INJECTION, SOLUTION SUBCUTANEOUS at 21:42

## 2024-09-09 RX ADMIN — ALLOPURINOL 300 MG: 300 TABLET ORAL at 08:48

## 2024-09-09 RX ADMIN — INSULIN GLARGINE 39 UNITS: 100 INJECTION, SOLUTION SUBCUTANEOUS at 08:48

## 2024-09-09 RX ADMIN — METOPROLOL SUCCINATE 100 MG: 100 TABLET, EXTENDED RELEASE ORAL at 08:49

## 2024-09-09 RX ADMIN — PANTOPRAZOLE SODIUM 40 MG: 40 TABLET, DELAYED RELEASE ORAL at 05:03

## 2024-09-09 RX ADMIN — ASPIRIN 81 MG: 81 TABLET, COATED ORAL at 08:50

## 2024-09-09 RX ADMIN — ATORVASTATIN CALCIUM 40 MG: 40 TABLET, FILM COATED ORAL at 17:20

## 2024-09-09 RX ADMIN — INSULIN LISPRO 4 UNITS: 100 INJECTION, SOLUTION INTRAVENOUS; SUBCUTANEOUS at 12:05

## 2024-09-09 RX ADMIN — APIXABAN 5 MG: 5 TABLET, FILM COATED ORAL at 08:48

## 2024-09-09 RX ADMIN — INSULIN LISPRO 5 UNITS: 100 INJECTION, SOLUTION INTRAVENOUS; SUBCUTANEOUS at 12:05

## 2024-09-09 RX ADMIN — LEVETIRACETAM 1750 MG: 500 TABLET, FILM COATED ORAL at 08:48

## 2024-09-09 RX ADMIN — PHENAZOPYRIDINE 100 MG: 100 TABLET ORAL at 08:57

## 2024-09-09 RX ADMIN — INSULIN LISPRO 4 UNITS: 100 INJECTION, SOLUTION INTRAVENOUS; SUBCUTANEOUS at 17:23

## 2024-09-09 RX ADMIN — INSULIN LISPRO 5 UNITS: 100 INJECTION, SOLUTION INTRAVENOUS; SUBCUTANEOUS at 17:23

## 2024-09-09 RX ADMIN — FUROSEMIDE 80 MG: 80 TABLET ORAL at 08:49

## 2024-09-09 RX ADMIN — INSULIN LISPRO 2 UNITS: 100 INJECTION, SOLUTION INTRAVENOUS; SUBCUTANEOUS at 08:51

## 2024-09-09 NOTE — ASSESSMENT & PLAN NOTE
Wt Readings from Last 3 Encounters:   09/09/24 (!) 156 kg (344 lb 5.7 oz)   09/06/24 (!) 158 kg (349 lb)   08/17/24 (!) 166 kg (365 lb)     Daily weights  Continue Lasix  Continue beta-blocker  Currently euvolemic

## 2024-09-09 NOTE — ASSESSMENT & PLAN NOTE
Lab Results   Component Value Date    HGBA1C 7.4 (H) 09/07/2024       Recent Labs     09/08/24  1654 09/08/24 2057 09/09/24  0748 09/09/24  1203   POCGLU 224* 216* 159* 241*         Blood Sugar Average: Last 72 hrs:  (P) [id number]  Continue long-acting insulin 39 units every 12 hours  Sliding scale insulin due to elevated blood sugars add additional Humalog 5 units 3 times daily with meals  Diabetic diet  Before meals and at bedtime glucose checks

## 2024-09-09 NOTE — ASSESSMENT & PLAN NOTE
Blood Pressure controlled . continue home meds except losartan as patient received IV contrast last night hold for at least 48 hours

## 2024-09-09 NOTE — CONSULTS
"Consult received for nutrition education.     Pt reports good appetite currently and PTA. Reports wife prepares meals for pt at home, has not been pleased with food options at home. Feels he is limited in options at home. Says wife has been limiting desserts she brings into the house, pt cannot drive to pick them up himself. Pt reports allergy to all sugar substitutes causing anaphylaxis. \"I avoid them all to be safe\". Aspartame noted in allergy list. Author added saccharin, sucralose, acesulfame potassium, sorbitol, stevia to allergy list. Pt reports his throat closes up if he were to consume these. Pt reports instead at home will have sweetened beverages such as regular gingerale or eat regular sugar sweetened desserts if available. Chart review of weight hx: 8/10/23 368lb, 2/6/24 360lb, 8/17/24 365lb. No significant weight changes.     Pt was not interested in full diet instruction at this time. Pt not pleased with food options at hospital. CCD 1 not appropriate compared to estimated needs, modified to CCD 2 to increase portions, also ordered double PRO portions.   Suggested use of lemon juice with water given beverage limitations with allergy. Suggested use of Hint water after discharge for home use which does not contain sugar or sugar substitutes, pt does not like [employer] and not agreeable to these.   "

## 2024-09-09 NOTE — ASSESSMENT & PLAN NOTE
Patient is a 69-year-old male with a past medical history of hypertension, hyperlipidemia, diabetes, morbid obesity, seizure disorder, and CHF who presents to the hospital with urinary complaints.  He reports that he has had weakness dark urine and fever for the last few days, he went to urgent care who told him he may have sepsis and that he should come to the emergency room.  In the emergency room he not found to have sepsis however his urinalysis suggest infection and CAT scan suggest pyelonephritis.  He will be admitted to the hospital and treated for acute pyelonephritis.  Continue ceftriaxone   urine culture mixed contaminant.  Blood culture pending at this time.  Dysuria improving with Pyridium but still has bilateral flank pain.

## 2024-09-09 NOTE — CASE MANAGEMENT
Case Management Discharge Planning Note    Patient name Pranav Vizcaino  Location /-01 MRN [id number]  : 1955 Date 2024       Current Admission Date: 2024  Current Admission Diagnosis:Pyelonephritis   Patient Active Problem List    Diagnosis Date Noted Date Diagnosed    History of atrial fibrillation 2024     Pyelonephritis 2024     Benign essential HTN 04/15/2021     Type 2 diabetes mellitus without complication, with long-term current use of insulin ([facility]) 04/15/2021     JULISSA (acute kidney injury) ([facility]) 04/15/2021     Morbid obesity due to excess calories ([facility]) 04/15/2021     Mixed hyperlipidemia 04/15/2021     Seizure disorder ([facility]) 04/15/2021     Chronic diastolic CHF (congestive heart failure) ([facility]) 04/15/2021       LOS (days): 2  Geometric Mean LOS (GMLOS) (days): 2.9  Days to GMLOS:0.9     OBJECTIVE:  Risk of Unplanned Readmission Score: 12.24         Current admission status: Inpatient   Preferred Pharmacy:   [id number] - [place] [address]  Phone: [phone] Fax: [phone]    Primary Care Provider: Ruslan Morales MD    Primary Insurance: [facility]  Secondary Insurance:     DISCHARGE DETAILS:     Notified by therapy that patient did better today, family was present and preferred to have OP therapy, aidin referral for HHC was cancelled.  DC plan is home.

## 2024-09-09 NOTE — CONSULTS
"Consultation - [facility]  Pranav Vizcaino 69 y.o. male MRN: [id number]  Unit/Bed#: -01 Encounter: [id number]      ASSESSMENT & PLAN    Abnormal CT, gallbladder  Hx of CCY   A-fib on Eliquis   Patient admitted with pyelonephritis incidentally noted to possibly have stones in the cystic duct remnant   Labs with AST 49, ALT 51, alk phos 122, T bili 1.11  Reports prior ERCP years ago to removed retained stones after CCY, unable to find records in patient chart  Given patient currently asymptomatic, recommend outpatient follow up in 1 week with Dr. Lucero to discuss possible non urgent EUS/ERCP  Continue to monitor CMP  GI will sign off a this time, please reach out with any questions or concerns      Reason for Consult / Principal Problem: gallbladder stones    HPI: Pranav Vizcaino is a 69 y.o. year old male with a PMHx A-fib on Eliquis, CHF, DM, gout, HTN, HLD, morbid obesity, STACY, seizure disorder, hx of CCY who presented with dark urine, weakness and fever.     In the ED, found to be septic with evidence of pyelonephritis on CT. He was admitted with pyelonephritis and started on antibiotics.     GI has been consulted for incidental abnormal findings of gallbladder on CT.      CT AP w/ IV contrast with \"Stones in the right upper quadrant may be within a cystic duct remnant versus contracted gallbladder. The common duct is borderline at 7 mm. Correlation with surgical history is advised and LFTs. Nonemergent MRCP follow-up may be useful.\"    Patient with history of cholecystectomy approximately 15 years ago. Patient reports a few years after his surgery, he was found to have gallstones and underwent an ERCP. He states this was done at [facility]. He reports occasional RUQ pain when straining to have a BM. No black or bloody stools.     Labs this AM with AST 49, ALT 51, alk phos 122, T bili 1.11    Last colonoscopy 4/2019    Endoscopic risk assessment:  Anticoagulation use: " Eliquis  Diabetes medication: Ozempic, insulin  CVS history: no  Abdominal surgeries: CCY  Sleep apnea: yes  O2 use: no    Past Medical History:   Diagnosis Date    A-fib (HCC)     CHF (congestive heart failure) (HCC)     Diabetes mellitus (HCC)     Gout     High blood pressure      Past Surgical History:   Procedure Laterality Date    BRAIN TUMOR EXCISION      CHOLECYSTECTOMY      KIDNEY STONE SURGERY      KNEE SURGERY       Social History   Social History     Substance and Sexual Activity   Alcohol Use Yes    Comment: rarely     Social History     Substance and Sexual Activity   Drug Use No     Social History     Tobacco Use   Smoking Status Never   Smokeless Tobacco Never       Family History   Problem Relation Age of Onset    Cancer Mother     Cancer Father          Medications Prior to Admission:     Admelog SoloStar 100 units/mL injection pen    ALLOPURINOL PO    apixaban (ELIQUIS) 5 mg    ascorbic acid (VITAMIN C) 1000 MG tablet    aspirin (ECOTRIN LOW STRENGTH) 81 mg EC tablet    Atorvastatin Calcium (LIPITOR PO)    cholecalciferol (VITAMIN D3) 1,000 units tablet    Furosemide (LASIX PO)    insulin degludec (Tresiba FlexTouch) 100 units/mL injection pen    levETIRAcetam (KEPPRA) 250 mg tablet    levothyroxine (SYNTHROID) 300 MCG tablet    losartan (COZAAR) 50 mg tablet    metoprolol succinate (TOPROL-XL) 100 mg 24 hr tablet    montelukast (SINGULAIR) 10 mg tablet    pantoprazole (PROTONIX) 40 mg tablet    Potassium (POTASSIMIN PO)    semaglutide, 1 mg/dose, (Ozempic, 1 MG/DOSE,) 4 mg/3 mL injection pen    zonisamide (ZONEGRAN) 100 mg capsule    acetaminophen (TYLENOL) 325 mg tablet    albuterol (PROVENTIL HFA,VENTOLIN HFA) 90 mcg/act inhaler    zinc sulfate (ZINCATE) 220 mg capsule  Current Facility-Administered Medications   Medication Dose Route Frequency    acetaminophen (TYLENOL) tablet 650 mg  650 mg Oral Q6H PRN    allopurinol (ZYLOPRIM) tablet 300 mg  300 mg Oral Daily    apixaban (ELIQUIS) tablet 5  mg  5 mg Oral BID    aspirin (ECOTRIN LOW STRENGTH) EC tablet 81 mg  81 mg Oral Daily    atorvastatin (LIPITOR) tablet 40 mg  40 mg Oral After Dinner    cefTRIAXone (ROCEPHIN) IVPB (premix in dextrose) 2,000 mg 50 mL  2,000 mg Intravenous Q24H    furosemide (LASIX) tablet 80 mg  80 mg Oral Daily    insulin glargine (LANTUS) subcutaneous injection 39 Units 0.39 mL  39 Units Subcutaneous Q12H SHANA    insulin lispro (HumALOG/ADMELOG) 100 units/mL subcutaneous injection 1-6 Units  1-6 Units Subcutaneous HS    insulin lispro (HumALOG/ADMELOG) 100 units/mL subcutaneous injection 2-12 Units  2-12 Units Subcutaneous TID AC    insulin lispro (HumALOG/ADMELOG) 100 units/mL subcutaneous injection 5 Units  5 Units Subcutaneous TID With Meals    levETIRAcetam (KEPPRA) tablet 1,750 mg  1,750 mg Oral Q12H SHANA    levothyroxine tablet 300 mcg  300 mcg Oral Once per day on Monday Tuesday Wednesday Thursday Friday Saturday    metoprolol succinate (TOPROL-XL) 24 hr tablet 100 mg  100 mg Oral Daily    montelukast (SINGULAIR) tablet 10 mg  10 mg Oral Daily    oxyCODONE (ROXICODONE) IR tablet 5 mg  5 mg Oral Q6H PRN    pantoprazole (PROTONIX) EC tablet 40 mg  40 mg Oral Daily Before Breakfast    phenazopyridine (PYRIDIUM) tablet 100 mg  100 mg Oral TID With Meals     Allergies   Allergen Reactions    Aspartame - Food Allergy Anaphylaxis     ALL ARTIFICIAL SWEETNERS    Bactrim [Sulfamethoxazole-Trimethoprim] Anaphylaxis    Adhesive  [Medical Tape] Rash     PAPER TAPE ONLY per paper       Physical Exam  Constitutional:       General: He is not in acute distress.     Appearance: He is not ill-appearing.   HENT:      Head: Normocephalic and atraumatic.      Mouth/Throat:      Mouth: Mucous membranes are moist.   Eyes:      General: No scleral icterus.  Pulmonary:      Effort: Pulmonary effort is normal. No respiratory distress.   Abdominal:      General: Abdomen is flat. There is no distension.      Palpations: Abdomen is soft.       Tenderness: There is no abdominal tenderness. There is no guarding.   Skin:     General: Skin is warm and dry.      Coloration: Skin is not jaundiced.   Neurological:      Mental Status: He is alert.       Most Recent Vital Signs:  Vitals:    09/08/24 1935 09/09/24 0600 09/09/24 0749 09/09/24 0856   BP:   132/69    BP Location:       Pulse:   75    Resp:   17    Temp:   97.5 °F (36.4 °C)    TempSrc:       SpO2: 94%  94% 96%   Weight:  (!) 156 kg (344 lb 5.7 oz)     Height:           Intake/Output Summary (Last 24 hours) at 9/9/2024 0944  Last data filed at 9/9/2024 0749  Gross per 24 hour   Intake 1116 ml   Output 1550 ml   Net -434 ml       LABS/IMAGING  Lab Results: I have reviewed all relevant lab results during this hospitalization.    Imaging Studies:  I have reviewed all the relevant images during this hospitalizations    Counseling / Coordination of Care  Total time spent today 45 minutes. Greater than 50% of total time was spent with the patient and / or family counseling and / or coordination of care.    Lyly Cardona PA-C

## 2024-09-09 NOTE — PROGRESS NOTES
[facility]  Progress Note  Name: Pranav Vizcaino I  MRN: [id number]  Unit/Bed#: -01 I Date of Admission: 9/6/2024   Date of Service: 9/9/2024 I Hospital Day: 3    Assessment & Plan   * Pyelonephritis  Assessment & Plan  Patient is a 69-year-old male with a past medical history of hypertension, hyperlipidemia, diabetes, morbid obesity, seizure disorder, and CHF who presents to the hospital with urinary complaints.  He reports that he has had weakness dark urine and fever for the last few days, he went to urgent care who told him he may have sepsis and that he should come to the emergency room.  In the emergency room he not found to have sepsis however his urinalysis suggest infection and CAT scan suggest pyelonephritis.  He will be admitted to the hospital and treated for acute pyelonephritis.  Continue ceftriaxone   urine culture mixed contaminant.  Blood culture pending at this time.  Dysuria improving with Pyridium but still has bilateral flank pain.    History of atrial fibrillation  Assessment & Plan  Currently in sinus rhythm  Continue Eliquis  Continue beta-blocker    Chronic diastolic CHF (congestive heart failure) ([facility])  Assessment & Plan  Wt Readings from Last 3 Encounters:   09/09/24 (!) 156 kg (344 lb 5.7 oz)   09/06/24 (!) 158 kg (349 lb)   08/17/24 (!) 166 kg (365 lb)     Daily weights  Continue Lasix  Continue beta-blocker  Currently euvolemic          Seizure disorder ([facility])  Assessment & Plan  Continue home dose Keppra twice daily  Continues on Zonisamide    Mixed hyperlipidemia  Assessment & Plan  Cont lipitor    Morbid obesity due to excess calories ([facility])  Assessment & Plan  bMI is 43.87.  Needs diet exercise and lifestyle modification counseling    Type 2 diabetes mellitus without complication, with long-term current use of insulin ([facility])  Assessment & Plan  Lab Results   Component Value Date    HGBA1C 7.4 (H) 09/07/2024       Recent Labs     09/08/24  6320  09/08/24 2057 09/09/24  0748 09/09/24  1203   POCGLU 224* 216* 159* 241*         Blood Sugar Average: Last 72 hrs:  (P) [id number]  Continue long-acting insulin 39 units every 12 hours  Sliding scale insulin due to elevated blood sugars add additional Humalog 5 units 3 times daily with meals  Diabetic diet  Before meals and at bedtime glucose checks    Benign essential HTN  Assessment & Plan  Blood Pressure controlled . continue home meds except losartan as patient received IV contrast last night hold for at least 48 hours               VTE Pharmacologic Prophylaxis: VTE Score: 13 Moderate Risk (Score 3-4) - Pharmacological DVT Prophylaxis Ordered: apixaban (Eliquis).    Mobility:   Basic Mobility Inpatient Raw Score: 18  JH-HLM Goal: 6: Walk 10 steps or more  JH-HLM Achieved: 7: Walk 25 feet or more  JH-HLM Goal achieved. Continue to encourage appropriate mobility.    Patient Centered Rounds: I performed bedside rounds with nursing staff today.   Discussions with Specialists or Other Care Team Provider: jaylen inf disease    Education and Discussions with Family / Patient: will update    Total Time Spent on Date of Encounter in care of patient: 35 mins. This time was spent on one or more of the following: performing physical exam; counseling and coordination of care; obtaining or reviewing history; documenting in the medical record; reviewing/ordering tests, medications or procedures; communicating with other healthcare professionals and discussing with patient's family/caregivers.    Current Length of Stay: 3 day(s)  Current Patient Status: Inpatient   Certification Statement: The patient will continue to require additional inpatient hospital stay due to pyelonephritis  Discharge Plan: Anticipate discharge tomorrow to home with home services.    Code Status: Prior    Subjective:   Patient denies any chest pain or shortness of breath still complaining of flank pain and feeling ill and has increased frequency  of urination    Objective:     Vitals:   Temp (24hrs), Av.6 °F (36.4 °C), Min:97.5 °F (36.4 °C), Max:97.7 °F (36.5 °C)    Temp:  [97.5 °F (36.4 °C)-97.7 °F (36.5 °C)] 97.5 °F (36.4 °C)  HR:  [75-83] 75  Resp:  [17-18] 17  BP: (132-140)/(69-73) 132/69  SpO2:  [94 %-96 %] 94 %  Body mass index is 43.04 kg/m².     Input and Output Summary (last 24 hours):     Intake/Output Summary (Last 24 hours) at 2024 1311  Last data filed at 2024 1302  Gross per 24 hour   Intake 960 ml   Output 1275 ml   Net -315 ml       Physical Exam:   Physical Exam  Vitals and nursing note reviewed.   Constitutional:       Appearance: He is ill-appearing.   HENT:      Head: Normocephalic and atraumatic.      Right Ear: External ear normal.      Left Ear: External ear normal.      Nose: Nose normal.      Mouth/Throat:      Pharynx: Oropharynx is clear.   Cardiovascular:      Rate and Rhythm: Normal rate and regular rhythm.      Heart sounds: Normal heart sounds.   Pulmonary:      Effort: Pulmonary effort is normal.      Breath sounds: Normal breath sounds.   Abdominal:      General: Bowel sounds are normal.      Palpations: Abdomen is soft.      Tenderness: There is no abdominal tenderness. There is right CVA tenderness and left CVA tenderness.   Musculoskeletal:         General: Normal range of motion.      Cervical back: Normal range of motion and neck supple.   Skin:     General: Skin is warm and dry.      Capillary Refill: Capillary refill takes less than 2 seconds.   Neurological:      General: No focal deficit present.      Mental Status: He is alert and oriented to person, place, and time.   Psychiatric:         Mood and Affect: Mood normal.            Additional Data:     Labs:  Results from last 7 days   Lab Units 24  0458 24  0452 24  1859   WBC Thousand/uL 5.08   < > 11.61*   HEMOGLOBIN g/dL 13.0   < > 13.6   HEMATOCRIT % 37.3   < > 38.5   PLATELETS Thousands/uL 161   < > 147*   SEGS PCT %  --   --  83*    LYMPHO PCT %  --   --  7*   MONO PCT %  --   --  8   EOS PCT %  --   --  1    < > = values in this interval not displayed.     Results from last 7 days   Lab Units 09/09/24  0458   SODIUM mmol/L 138   POTASSIUM mmol/L 3.5   CHLORIDE mmol/L 106   CO2 mmol/L 25   BUN mg/dL 17   CREATININE mg/dL 1.10   ANION GAP mmol/L 7   CALCIUM mg/dL 9.5   ALBUMIN g/dL 3.5   TOTAL BILIRUBIN mg/dL 1.11*   ALK PHOS U/L 122*   ALT U/L 51   AST U/L 49*   GLUCOSE RANDOM mg/dL 175*     Results from last 7 days   Lab Units 09/06/24  1859   INR  1.34*     Results from last 7 days   Lab Units 09/09/24  1203 09/09/24  0748 09/08/24  2057 09/08/24  1654 09/08/24  1122 09/08/24  0740 09/07/24  2047 09/07/24  1608 09/07/24  1135 09/07/24  0718 09/06/24  2351 09/06/24  1844   POC GLUCOSE mg/dl 241* 159* 216* 224* 269* 226* 338* 281* 299* 260* 241* 311*     Results from last 7 days   Lab Units 09/07/24  0508 09/06/24  1859   HEMOGLOBIN A1C % 7.4* 7.2*     Results from last 7 days   Lab Units 09/06/24  1859   LACTIC ACID mmol/L 1.8   PROCALCITONIN ng/ml 0.24       Lines/Drains:  Invasive Devices       Peripheral Intravenous Line  Duration             Peripheral IV 09/06/24 Dorsal (posterior);Right Wrist 2 days                          Imaging: Reviewed radiology reports from this admission including: abdominal/pelvic CT    Recent Cultures (last 7 days):   Results from last 7 days   Lab Units 09/06/24  1859 09/06/24  1850 09/06/24  1701   BLOOD CULTURE  No Growth at 24 hrs.  --   --    URINE CULTURE   --  No Growth <1000 cfu/mL <10,000 cfu/ml       Last 24 Hours Medication List:   Current Facility-Administered Medications   Medication Dose Route Frequency Provider Last Rate    acetaminophen  650 mg Oral Q6H PRN Mary Lou Geller MD      allopurinol  300 mg Oral Daily RIA Mullen      apixaban  5 mg Oral BID RIA Mullen      aspirin  81 mg Oral Daily RIA Mullen      atorvastatin  40 mg Oral After  Dinner RIA Mullen      cefTRIAXone  2,000 mg Intravenous Q24H RIA Mullen 2,000 mg (09/08/24 2237)    furosemide  80 mg Oral Daily RIA Mullen      insulin glargine  39 Units Subcutaneous Q12H [facility] RIA Mullen      insulin lispro  1-6 Units Subcutaneous HS Mary Lou Geller MD      insulin lispro  2-12 Units Subcutaneous TID AC Mary Lou Geller MD      insulin lispro  5 Units Subcutaneous TID With Meals Mary Lou Geller MD      levETIRAcetam  1,750 mg Oral Q12H [facility] RIA Mullen      levothyroxine  300 mcg Oral Once per day on Monday Tuesday Wednesday Thursday Friday Saturday RIA Mullen      metoprolol succinate  100 mg Oral Daily RIA Mullen      montelukast  10 mg Oral Daily RIA Mullen      oxyCODONE  5 mg Oral Q6H PRN Mary Lou Geller MD      pantoprazole  40 mg Oral Daily Before Breakfast RIA Mullen      phenazopyridine  100 mg Oral TID With Meals Mary Lou Geller MD          Today, Patient Was Seen By: Mary Lou Geller MD    **Please Note: This note may have been constructed using a voice recognition system.**

## 2024-09-09 NOTE — PLAN OF CARE
Problem: PAIN - ADULT  Goal: Verbalizes/displays adequate comfort level or baseline comfort level  Description: Interventions:  - Encourage patient to monitor pain and request assistance  - Assess pain using appropriate pain scale  - Administer analgesics based on type and severity of pain and evaluate response  - Implement non-pharmacological measures as appropriate and evaluate response  - Consider cultural and social influences on pain and pain management  - Notify physician/advanced practitioner if interventions unsuccessful or patient reports new pain  Outcome: Progressing     Problem: INFECTION - ADULT  Goal: Absence or prevention of progression during hospitalization  Description: INTERVENTIONS:  - Assess and monitor for signs and symptoms of infection  - Monitor lab/diagnostic results  - Monitor all insertion sites, i.e. indwelling lines, tubes, and drains  - Monitor endotracheal if appropriate and nasal secretions for changes in amount and color  - [place] appropriate cooling/warming therapies per order  - Administer medications as ordered  - Instruct and encourage patient and family to use good hand hygiene technique  - Identify and instruct in appropriate isolation precautions for identified infection/condition  Outcome: Progressing  Goal: Absence of fever/infection during neutropenic period  Description: INTERVENTIONS:  - Monitor WBC    Outcome: Progressing

## 2024-09-10 ENCOUNTER — APPOINTMENT (INPATIENT)
Dept: ULTRASOUND IMAGING | Facility: HOSPITAL | Age: 69
DRG: 690 | End: 2024-09-10
Payer: COMMERCIAL

## 2024-09-10 VITALS
OXYGEN SATURATION: 94 % | SYSTOLIC BLOOD PRESSURE: 150 MMHG | DIASTOLIC BLOOD PRESSURE: 76 MMHG | HEART RATE: 79 BPM | RESPIRATION RATE: 17 BRPM | HEIGHT: 75 IN | BODY MASS INDEX: 39.17 KG/M2 | TEMPERATURE: 97.3 F | WEIGHT: 315 LBS

## 2024-09-10 LAB
GLUCOSE SERPL-MCNC: 188 MG/DL (ref 65–140)
GLUCOSE SERPL-MCNC: 233 MG/DL (ref 65–140)

## 2024-09-10 PROCEDURE — 82948 REAGENT STRIP/BLOOD GLUCOSE: CPT

## 2024-09-10 PROCEDURE — 99239 HOSP IP/OBS DSCHRG MGMT >30: CPT | Performed by: FAMILY MEDICINE

## 2024-09-10 PROCEDURE — 76775 US EXAM ABDO BACK WALL LIM: CPT

## 2024-09-10 RX ORDER — PHENAZOPYRIDINE HYDROCHLORIDE 100 MG/1
100 TABLET, FILM COATED ORAL
Qty: 10 TABLET | Refills: 0 | Status: SHIPPED | OUTPATIENT
Start: 2024-09-10

## 2024-09-10 RX ORDER — INSULIN DEGLUDEC 100 U/ML
39 INJECTION, SOLUTION SUBCUTANEOUS EVERY 12 HOURS SCHEDULED
Start: 2024-09-10

## 2024-09-10 RX ORDER — CEPHALEXIN 500 MG/1
500 CAPSULE ORAL EVERY 6 HOURS SCHEDULED
Qty: 28 CAPSULE | Refills: 0 | Status: SHIPPED | OUTPATIENT
Start: 2024-09-10 | End: 2024-09-17

## 2024-09-10 RX ADMIN — INSULIN LISPRO 4 UNITS: 100 INJECTION, SOLUTION INTRAVENOUS; SUBCUTANEOUS at 12:08

## 2024-09-10 RX ADMIN — ALLOPURINOL 300 MG: 300 TABLET ORAL at 08:47

## 2024-09-10 RX ADMIN — FUROSEMIDE 80 MG: 80 TABLET ORAL at 08:47

## 2024-09-10 RX ADMIN — PHENAZOPYRIDINE 100 MG: 100 TABLET ORAL at 08:47

## 2024-09-10 RX ADMIN — INSULIN GLARGINE 39 UNITS: 100 INJECTION, SOLUTION SUBCUTANEOUS at 08:45

## 2024-09-10 RX ADMIN — LEVETIRACETAM 1750 MG: 500 TABLET, FILM COATED ORAL at 08:47

## 2024-09-10 RX ADMIN — INSULIN LISPRO 2 UNITS: 100 INJECTION, SOLUTION INTRAVENOUS; SUBCUTANEOUS at 08:45

## 2024-09-10 RX ADMIN — PHENAZOPYRIDINE 100 MG: 100 TABLET ORAL at 12:08

## 2024-09-10 RX ADMIN — APIXABAN 5 MG: 5 TABLET, FILM COATED ORAL at 08:47

## 2024-09-10 RX ADMIN — INSULIN LISPRO 5 UNITS: 100 INJECTION, SOLUTION INTRAVENOUS; SUBCUTANEOUS at 12:08

## 2024-09-10 RX ADMIN — INSULIN LISPRO 5 UNITS: 100 INJECTION, SOLUTION INTRAVENOUS; SUBCUTANEOUS at 08:45

## 2024-09-10 RX ADMIN — PANTOPRAZOLE SODIUM 40 MG: 40 TABLET, DELAYED RELEASE ORAL at 05:33

## 2024-09-10 RX ADMIN — LEVOTHYROXINE SODIUM 300 MCG: 150 TABLET ORAL at 05:33

## 2024-09-10 RX ADMIN — ASPIRIN 81 MG: 81 TABLET, COATED ORAL at 08:47

## 2024-09-10 RX ADMIN — METOPROLOL SUCCINATE 100 MG: 100 TABLET, EXTENDED RELEASE ORAL at 08:47

## 2024-09-10 RX ADMIN — MONTELUKAST 10 MG: 10 TABLET, FILM COATED ORAL at 08:47

## 2024-09-10 NOTE — DISCHARGE SUMMARY
Discharge Summary - Hospitalist   Name: Pranav Vizcaino 69 y.o. male I MRN: [id number]  Unit/Bed#: -01 I Date of Admission: 9/6/2024   Date of Service: 9/10/2024 I Hospital Day: 4     Assessment & Plan  Pyelonephritis  Patient is a 69-year-old male with a past medical history of hypertension, hyperlipidemia, diabetes, morbid obesity, seizure disorder, and CHF who presents to the hospital with urinary complaints.  He reports that he has had weakness dark urine and fever for the last few days, he went to urgent care who told him he may have sepsis and that he should come to the emergency room.  In the emergency room he not found to have sepsis however his urinalysis suggest infection and CAT scan suggest pyelonephritis.  He will be admitted to the hospital and treated for acute pyelonephritis.  Continue ceftriaxone   urine culture mixed contaminant.  Blood culture neg for 48 hours.  Dysuria improving with Pyridium   Will do ultrasound of the kidneys and bladder and if continues to improve will discharge home today with a course of oral antibiotics  Benign essential HTN  Blood Pressure controlled . continue home meds except losartan as patient received IV contrast last night hold for at least 48 hours. Resume losartan tomorrow  Type 2 diabetes mellitus without complication, with long-term current use of insulin ([facility]ly [facility])  Lab Results   Component Value Date    HGBA1C 7.4 (H) 09/07/2024       Recent Labs     09/09/24  1203 09/09/24  1609 09/09/24  2041 09/10/24  0823   POCGLU 241* 214* 210* 188*       Blood Sugar Average: Last 72 hrs:  (P) 240.7952357822601763  Continue long-acting insulin 39 units every 12 hours  Sliding scale insulin due to elevated blood sugars add additional Humalog 5 units 3 times daily with meals  Diabetic diet  Morbid obesity due to excess calories (HCC)  bMI is 43.87.  Needs diet exercise and lifestyle modification counseling  Mixed hyperlipidemia  Cont lipitor  Seizure disorder (HCC)  Continue  home dose Keppra twice daily  Continues on Zonisamide  Chronic diastolic CHF (congestive heart failure) (HCC)  Wt Readings from Last 3 Encounters:   09/10/24 (!) 155 kg (342 lb 9.6 oz)   09/06/24 (!) 158 kg (349 lb)   08/17/24 (!) 166 kg (365 lb)     Daily weights  Continue Lasix  Continue beta-blocker  Currently euvolemic        History of atrial fibrillation  Currently in sinus rhythm  Continue Eliquis  Continue beta-blocker          Recent Labs     09/08/24  1654 09/08/24  2057 09/09/24  0748 09/09/24  1203   POCGLU 224* 216* 159* 241*         Blood Sugar Average: Last 72 hrs:  (P) 255.3480328756753822  Continue long-acting insulin 39 units every 12 hours  Sliding scale insulin due to elevated blood sugars add additional Humalog 5 units 3 times daily with meals  Diabetic diet  Before meals and at bedtime glucose checks      Discharging Physician / Practitioner: Mary Lou Geller MD  PCP: Ruslan Morales MD  Admission Date:   Admission Orders (From admission, onward)       Ordered        09/06/24 2118  INPATIENT ADMISSION  Once                          Discharge Date: 09/10/24    Medical Problems       Resolved Problems  Date Reviewed: 9/10/2024   None         Consultations During Hospital Stay:  Gi,urology    Procedures Performed:   none    Significant Findings / Test Results:   CT abdomen pelvis with contrast    Result Date: 9/6/2024  Impression: Splenomegaly with lobular hepatic contour could be related to cirrhosis. Left greater than right perinephric stranding could be related to pyelonephritis. Stones in the right upper quadrant may be within a cystic duct remnant versus contracted gallbladder. The common duct is borderline at 7 mm. Correlation with surgical history is advised and LFTs. Nonemergent MRCP follow-up may be useful. Right lower quadrant ventral hernia is identified without evidence of obstruction at this time. This was discussed with Catarina GONZALEZ at 9:00 p.m. Workstation performed: [id number]   "    Incidental Findings:   none    Test Results Pending at Discharge (will require follow up):   none     Outpatient Tests Requested:  Outpt follow up with gi    Complications:  none    Reason for Admission: Bilateral pyelonephritis    Hospital Course:     Pranav Vizcaino is a 69 y.o. male patient who originally presented to the hospital on 9/6/2024 due to bilateral pyelonephritis.  Patient's blood culture and urine culture were negative.  Treated with IV Rocephin and clinically improving will discharge home on a course of Keflex to complete total 10-day course of antibiotics.  Patient also recommended to follow-up with GI as he was noted to have stone in the right upper quadrant within the cystic duct remnant versus a contracted gallbladder.  He needs follow-up outpatient EUS/ERCP to be scheduled.      Please see above list of diagnoses and related plan for additional information.     Condition at Discharge: good     Discharge Day Visit / Exam:     Subjective: Patient denies any chest pain or shortness of breath he feels his flank pain and dysuria are slowly improving.  Vitals: Blood Pressure: 150/76 (09/10/24 0747)  Pulse: 79 (09/10/24 0747)  Temperature: (!) 97.3 °F (36.3 °C) (09/10/24 0747)  Temp Source: Temporal (09/10/24 0747)  Respirations: 17 (09/10/24 0747)  Height: 6' 3\" (190.5 cm) (09/09/24 1006)  Weight - Scale: (!) 155 kg (342 lb 9.6 oz) (09/10/24 0600)  SpO2: 94 % (09/10/24 0900)  Exam:   Physical Exam  Vitals and nursing note reviewed.   Constitutional:       Appearance: Normal appearance. He is obese.   HENT:      Head: Normocephalic and atraumatic.      Right Ear: External ear normal.      Left Ear: External ear normal.      Nose: Nose normal.      Mouth/Throat:      Pharynx: Oropharynx is clear.   Eyes:      Pupils: Pupils are equal, round, and reactive to light.   Cardiovascular:      Rate and Rhythm: Normal rate and regular rhythm.      Heart sounds: Normal heart sounds.   Pulmonary:      Effort: " Pulmonary effort is normal.      Breath sounds: Normal breath sounds.   Abdominal:      General: Bowel sounds are normal.      Palpations: Abdomen is soft.      Tenderness: There is no abdominal tenderness.      Comments: Mild bilateral CVA tenderness   Musculoskeletal:         General: Normal range of motion.      Cervical back: Normal range of motion and neck supple.   Skin:     General: Skin is warm and dry.      Capillary Refill: Capillary refill takes less than 2 seconds.   Neurological:      General: No focal deficit present.      Mental Status: He is alert and oriented to person, place, and time.   Psychiatric:         Mood and Affect: Mood normal.         Discussion with Family: update wife    Discharge instructions/Information to patient and family:   See after visit summary for information provided to patient and family.      Provisions for Follow-Up Care:  See after visit summary for information related to follow-up care and any pertinent home health orders.      Disposition:     Home    For Discharges to [facility]:   Not Applicable to this Patient - Not Applicable to this Patient    Planned Readmission: none     Discharge Statement:  I spent 35 minutes discharging the patient. This time was spent on the day of discharge. I had direct contact with the patient on the day of discharge. Greater than 50% of the total time was spent examining patient, answering all patient questions, arranging and discussing plan of care with patient as well as directly providing post-discharge instructions.  Additional time then spent on discharge activities.    Discharge Medications:  See after visit summary for reconciled discharge medications provided to patient and family.      ** Please Note: This note has been constructed using a voice recognition system **

## 2024-09-10 NOTE — NURSING NOTE
Dr. eGller messaged via [employer] secure chat that patient currently does not have a code status listed in chart.

## 2024-09-10 NOTE — PLAN OF CARE
Problem: Potential for Falls  Goal: Patient will remain free of falls  Description: INTERVENTIONS:  - Educate patient/family on patient safety including physical limitations  - Instruct patient to call for assistance with activity   - Consult OT/PT to assist with strengthening/mobility   - Keep Call bell within reach  - Keep bed low and locked with side rails adjusted as appropriate  - Keep care items and personal belongings within reach  - Initiate and maintain comfort rounds  - Make Fall Risk Sign visible to staff  - Offer Toileting every 2 Hours, in advance of need  - Initiate/Maintain alarm  - Obtain necessary fall risk management equipment  - Apply yellow socks and bracelet for high fall risk patients  - Consider moving patient to room near nurses station  Outcome: Progressing     Problem: Prexisting or High Potential for Compromised Skin Integrity  Goal: Skin integrity is maintained or improved  Description: INTERVENTIONS:  - Identify patients at risk for skin breakdown  - Assess and monitor skin integrity  - Assess and monitor nutrition and hydration status  - Monitor labs   - Assess for incontinence   - Turn and reposition patient  - Assist with mobility/ambulation  - Relieve pressure over bony prominences  - Avoid friction and shearing  - Provide appropriate hygiene as needed including keeping skin clean and dry  - Evaluate need for skin moisturizer/barrier cream  - Collaborate with interdisciplinary team   - Patient/family teaching  - Consider wound care consult   Outcome: Progressing     Problem: PAIN - ADULT  Goal: Verbalizes/displays adequate comfort level or baseline comfort level  Description: Interventions:  - Encourage patient to monitor pain and request assistance  - Assess pain using appropriate pain scale  - Administer analgesics based on type and severity of pain and evaluate response  - Implement non-pharmacological measures as appropriate and evaluate response  - Consider cultural and social  influences on pain and pain management  - Notify physician/advanced practitioner if interventions unsuccessful or patient reports new pain  Outcome: Progressing     Problem: INFECTION - ADULT  Goal: Absence or prevention of progression during hospitalization  Description: INTERVENTIONS:  - Assess and monitor for signs and symptoms of infection  - Monitor lab/diagnostic results  - Monitor all insertion sites, i.e. indwelling lines, tubes, and drains  - Monitor endotracheal if appropriate and nasal secretions for changes in amount and color  - Crystal appropriate cooling/warming therapies per order  - Administer medications as ordered  - Instruct and encourage patient and family to use good hand hygiene technique  - Identify and instruct in appropriate isolation precautions for identified infection/condition  Outcome: Progressing  Goal: Absence of fever/infection during neutropenic period  Description: INTERVENTIONS:  - Monitor WBC    Outcome: Progressing     Problem: SAFETY ADULT  Goal: Patient will remain free of falls  Description: INTERVENTIONS:  - Educate patient/family on patient safety including physical limitations  - Instruct patient to call for assistance with activity   - Consult OT/PT to assist with strengthening/mobility   - Keep Call bell within reach  - Keep bed low and locked with side rails adjusted as appropriate  - Keep care items and personal belongings within reach  - Initiate and maintain comfort rounds  - Make Fall Risk Sign visible to staff  - Offer Toileting every 2 Hours, in advance of need  - Initiate/Maintain alarm  - Obtain necessary fall risk management equipment  - Apply yellow socks and bracelet for high fall risk patients  - Consider moving patient to room near nurses station  Outcome: Progressing  Goal: Maintain or return to baseline ADL function  Description: INTERVENTIONS:  -  Assess patient's ability to carry out ADLs; assess patient's baseline for ADL function and identify  physical deficits which impact ability to perform ADLs (bathing, care of mouth/teeth, toileting, grooming, dressing, etc.)  - Assess/evaluate cause of self-care deficits   - Assess range of motion  - Assess patient's mobility; develop plan if impaired  - Assess patient's need for assistive devices and provide as appropriate  - Encourage maximum independence but intervene and supervise when necessary  - Involve family in performance of ADLs  - Assess for home care needs following discharge   - Consider OT consult to assist with ADL evaluation and planning for discharge  - Provide patient education as appropriate  Outcome: Progressing  Goal: Maintains/Returns to pre admission functional level  Description: INTERVENTIONS:  - Perform AM-PAC 6 Click Basic Mobility/ Daily Activity assessment daily.  - Set and communicate daily mobility goal to care team and patient/family/caregiver.   - Collaborate with rehabilitation services on mobility goals if consulted  - Reposition patient every 2 hours.  - Stand patient 3 times a day  - Ambulate patient 3 times a day  - Out of bed to chair 3 times a day   - Out of bed for meals 3 times a day  - Out of bed for toileting  - Record patient progress and toleration of activity level   Outcome: Progressing     Problem: DISCHARGE PLANNING  Goal: Discharge to home or other facility with appropriate resources  Description: INTERVENTIONS:  - Identify barriers to discharge w/patient and caregiver  - Arrange for needed discharge resources and transportation as appropriate  - Identify discharge learning needs (meds, wound care, etc.)  - Arrange for interpretive services to assist at discharge as needed  - Refer to Case Management Department for coordinating discharge planning if the patient needs post-hospital services based on physician/advanced practitioner order or complex needs related to functional status, cognitive ability, or social support system  Outcome: Progressing     Problem:  Knowledge Deficit  Goal: Patient/family/caregiver demonstrates understanding of disease process, treatment plan, medications, and discharge instructions  Description: Complete learning assessment and assess knowledge base.  Interventions:  - Provide teaching at level of understanding  - Provide teaching via preferred learning methods  Outcome: Progressing     Problem: GENITOURINARY - ADULT  Goal: Maintains or returns to baseline urinary function  Description: INTERVENTIONS:  - Assess urinary function  - Encourage oral fluids to ensure adequate hydration if ordered  - Administer IV fluids as ordered to ensure adequate hydration  - Administer ordered medications as needed  - Offer frequent toileting  - Follow urinary retention protocol if ordered  Outcome: Progressing  Goal: Absence of urinary retention  Description: INTERVENTIONS:  - Assess patient’s ability to void and empty bladder  - Monitor I/O  - Bladder scan as needed  - Discuss with physician/AP medications to alleviate retention as needed  - Discuss catheterization for long term situations as appropriate  Outcome: Progressing  Goal: Urinary catheter remains patent  Description: INTERVENTIONS:  - Assess patency of urinary catheter  - If patient has a chronic aviles, consider changing catheter if non-functioning  - Follow guidelines for intermittent irrigation of non-functioning urinary catheter  Outcome: Progressing     Problem: MUSCULOSKELETAL - ADULT  Goal: Maintain or return mobility to safest level of function  Description: INTERVENTIONS:  - Assess patient's ability to carry out ADLs; assess patient's baseline for ADL function and identify physical deficits which impact ability to perform ADLs (bathing, care of mouth/teeth, toileting, grooming, dressing, etc.)  - Assess/evaluate cause of self-care deficits   - Assess range of motion  - Assess patient's mobility  - Assess patient's need for assistive devices and provide as appropriate  - Encourage maximum  independence but intervene and supervise when necessary  - Involve family in performance of ADLs  - Assess for home care needs following discharge   - Consider OT consult to assist with ADL evaluation and planning for discharge  - Provide patient education as appropriate  Outcome: Progressing  Goal: Maintain proper alignment of affected body part  Description: INTERVENTIONS:  - Support, maintain and protect limb and body alignment  - Provide patient/ family with appropriate education  Outcome: Progressing     Problem: Nutrition/Hydration-ADULT  Goal: Nutrient/Hydration intake appropriate for improving, restoring or maintaining nutritional needs  Description: Monitor and assess patient's nutrition/hydration status for malnutrition. Collaborate with interdisciplinary team and initiate plan and interventions as ordered.  Monitor patient's weight and dietary intake as ordered or per policy. Utilize nutrition screening tool and intervene as necessary. Determine patient's food preferences and provide high-protein, high-caloric foods as appropriate.     INTERVENTIONS:  - Monitor oral intake, urinary output, labs, and treatment plans  - Assess nutrition and hydration status and recommend course of action  - Evaluate amount of meals eaten  - Assist patient with eating if necessary   - Allow adequate time for meals  - Recommend/ encourage appropriate diets, oral nutritional supplements, and vitamin/mineral supplements  - Order, calculate, and assess calorie counts as needed  - Recommend, monitor, and adjust tube feedings and TPN/PPN based on assessed needs  - Assess need for intravenous fluids  - Provide specific nutrition/hydration education as appropriate  - Include patient/family/caregiver in decisions related to nutrition  Outcome: Progressing

## 2024-09-10 NOTE — PLAN OF CARE
Problem: Potential for Falls  Goal: Patient will remain free of falls  Description: INTERVENTIONS:  - Educate patient/family on patient safety including physical limitations  - Instruct patient to call for assistance with activity   - Consult OT/PT to assist with strengthening/mobility   - Keep Call bell within reach  - Keep bed low and locked with side rails adjusted as appropriate  - Keep care items and personal belongings within reach  - Initiate and maintain comfort rounds  - Make Fall Risk Sign visible to staff  - Offer Toileting every 2 Hours, in advance of need  - Initiate/Maintain bed/chair alarm  - Obtain necessary fall risk management equipment: walker  - Apply yellow socks and bracelet for high fall risk patients  - Consider moving patient to room near nurses station  Outcome: Progressing     Problem: Prexisting or High Potential for Compromised Skin Integrity  Goal: Skin integrity is maintained or improved  Description: INTERVENTIONS:  - Identify patients at risk for skin breakdown  - Assess and monitor skin integrity  - Assess and monitor nutrition and hydration status  - Monitor labs   - Assess for incontinence   - Turn and reposition patient  - Assist with mobility/ambulation  - Relieve pressure over bony prominences  - Avoid friction and shearing  - Provide appropriate hygiene as needed including keeping skin clean and dry  - Evaluate need for skin moisturizer/barrier cream  - Collaborate with interdisciplinary team   - Patient/family teaching  - Consider wound care consult   Outcome: Progressing     Problem: PAIN - ADULT  Goal: Verbalizes/displays adequate comfort level or baseline comfort level  Description: Interventions:  - Encourage patient to monitor pain and request assistance  - Assess pain using appropriate pain scale  - Administer analgesics based on type and severity of pain and evaluate response  - Implement non-pharmacological measures as appropriate and evaluate response  - Consider  cultural and social influences on pain and pain management  - Notify physician/advanced practitioner if interventions unsuccessful or patient reports new pain  Outcome: Progressing     Problem: INFECTION - ADULT  Goal: Absence or prevention of progression during hospitalization  Description: INTERVENTIONS:  - Assess and monitor for signs and symptoms of infection  - Monitor lab/diagnostic results  - Monitor all insertion sites, i.e. indwelling lines, tubes, and drains  - Monitor endotracheal if appropriate and nasal secretions for changes in amount and color  - [place] appropriate cooling/warming therapies per order  - Administer medications as ordered  - Instruct and encourage patient and family to use good hand hygiene technique  - Identify and instruct in appropriate isolation precautions for identified infection/condition  Outcome: Progressing  Goal: Absence of fever/infection during neutropenic period  Description: INTERVENTIONS:  - Monitor WBC    Outcome: Progressing     Problem: SAFETY ADULT  Goal: Patient will remain free of falls  Description: INTERVENTIONS:  - Educate patient/family on patient safety including physical limitations  - Instruct patient to call for assistance with activity   - Consult OT/PT to assist with strengthening/mobility   - Keep Call bell within reach  - Keep bed low and locked with side rails adjusted as appropriate  - Keep care items and personal belongings within reach  - Initiate and maintain comfort rounds  - Make Fall Risk Sign visible to staff  - Offer Toileting every 2 Hours, in advance of need  - Initiate/Maintain bed/chair alarm  - Obtain necessary fall risk management equipment: walker  - Apply yellow socks and bracelet for high fall risk patients  - Consider moving patient to room near nurses station  Outcome: Progressing  Goal: Maintain or return to baseline ADL function  Description: INTERVENTIONS:  -  Assess patient's ability to carry out ADLs; assess patient's  baseline for ADL function and identify physical deficits which impact ability to perform ADLs (bathing, care of mouth/teeth, toileting, grooming, dressing, etc.)  - Assess/evaluate cause of self-care deficits   - Assess range of motion  - Assess patient's mobility; develop plan if impaired  - Assess patient's need for assistive devices and provide as appropriate  - Encourage maximum independence but intervene and supervise when necessary  - Involve family in performance of ADLs  - Assess for home care needs following discharge   - Consider OT consult to assist with ADL evaluation and planning for discharge  - Provide patient education as appropriate  Outcome: Progressing  Goal: Maintains/Returns to pre admission functional level  Description: INTERVENTIONS:  - Perform AM-PAC 6 Click Basic Mobility/ Daily Activity assessment daily.  - Set and communicate daily mobility goal to care team and patient/family/caregiver.   - Collaborate with rehabilitation services on mobility goals if consulted  - Perform Range of Motion 3 times a day.  - Reposition patient every 2 hours.  - Dangle patient 3 times a day  - Stand patient 3 times a day  - Ambulate patient 3 times a day  - Out of bed to chair 3 times a day   - Out of bed for meals 3 times a day  - Out of bed for toileting  - Record patient progress and toleration of activity level   Outcome: Progressing     Problem: DISCHARGE PLANNING  Goal: Discharge to home or other facility with appropriate resources  Description: INTERVENTIONS:  - Identify barriers to discharge w/patient and caregiver  - Arrange for needed discharge resources and transportation as appropriate  - Identify discharge learning needs (meds, wound care, etc.)  - Arrange for interpretive services to assist at discharge as needed  - Refer to Case Management Department for coordinating discharge planning if the patient needs post-hospital services based on physician/advanced practitioner order or complex needs  related to functional status, cognitive ability, or social support system  Outcome: Progressing     Problem: Knowledge Deficit  Goal: Patient/family/caregiver demonstrates understanding of disease process, treatment plan, medications, and discharge instructions  Description: Complete learning assessment and assess knowledge base.  Interventions:  - Provide teaching at level of understanding  - Provide teaching via preferred learning methods  Outcome: Progressing     Problem: GENITOURINARY - ADULT  Goal: Maintains or returns to baseline urinary function  Description: INTERVENTIONS:  - Assess urinary function  - Encourage oral fluids to ensure adequate hydration if ordered  - Administer IV fluids as ordered to ensure adequate hydration  - Administer ordered medications as needed  - Offer frequent toileting  - Follow urinary retention protocol if ordered  Outcome: Progressing  Goal: Absence of urinary retention  Description: INTERVENTIONS:  - Assess patient’s ability to void and empty bladder  - Monitor I/O  - Bladder scan as needed  - Discuss with physician/AP medications to alleviate retention as needed  - Discuss catheterization for long term situations as appropriate  Outcome: Progressing  Goal: Urinary catheter remains patent  Description: INTERVENTIONS:  - Assess patency of urinary catheter  - If patient has a chronic aviles, consider changing catheter if non-functioning  - Follow guidelines for intermittent irrigation of non-functioning urinary catheter  Outcome: Progressing     Problem: MUSCULOSKELETAL - ADULT  Goal: Maintain or return mobility to safest level of function  Description: INTERVENTIONS:  - Assess patient's ability to carry out ADLs; assess patient's baseline for ADL function and identify physical deficits which impact ability to perform ADLs (bathing, care of mouth/teeth, toileting, grooming, dressing, etc.)  - Assess/evaluate cause of self-care deficits   - Assess range of motion  - Assess  patient's mobility  - Assess patient's need for assistive devices and provide as appropriate  - Encourage maximum independence but intervene and supervise when necessary  - Involve family in performance of ADLs  - Assess for home care needs following discharge   - Consider OT consult to assist with ADL evaluation and planning for discharge  - Provide patient education as appropriate  Outcome: Progressing  Goal: Maintain proper alignment of affected body part  Description: INTERVENTIONS:  - Support, maintain and protect limb and body alignment  - Provide patient/ family with appropriate education  Outcome: Progressing     Problem: Nutrition/Hydration-ADULT  Goal: Nutrient/Hydration intake appropriate for improving, restoring or maintaining nutritional needs  Description: Monitor and assess patient's nutrition/hydration status for malnutrition. Collaborate with interdisciplinary team and initiate plan and interventions as ordered.  Monitor patient's weight and dietary intake as ordered or per policy. Utilize nutrition screening tool and intervene as necessary. Determine patient's food preferences and provide high-protein, high-caloric foods as appropriate.     INTERVENTIONS:  - Monitor oral intake, urinary output, labs, and treatment plans  - Assess nutrition and hydration status and recommend course of action  - Evaluate amount of meals eaten  - Assist patient with eating if necessary   - Allow adequate time for meals  - Recommend/ encourage appropriate diets, oral nutritional supplements, and vitamin/mineral supplements  - Order, calculate, and assess calorie counts as needed  - Recommend, monitor, and adjust tube feedings and TPN/PPN based on assessed needs  - Assess need for intravenous fluids  - Provide specific nutrition/hydration education as appropriate  - Include patient/family/caregiver in decisions related to nutrition  Outcome: Progressing

## 2024-09-10 NOTE — ASSESSMENT & PLAN NOTE
Blood Pressure controlled . continue home meds except losartan as patient received IV contrast last night hold for at least 48 hours. Resume losartan tomorrow

## 2024-09-10 NOTE — NURSING NOTE
Patient's IV removed and catheter intact. AVS reviewed with patient, wife, and daughter including new medications, follow up appointments, and education regarding heart failure. Patient escorted out by this LPN via wheelchair.

## 2024-09-10 NOTE — CASE MANAGEMENT
Case Management Discharge Planning Note    Patient name Pranav Vizcaino  Location /-01 MRN [id number]  : 1955 Date 9/10/2024       Current Admission Date: 2024  Current Admission Diagnosis:Pyelonephritis   Patient Active Problem List    Diagnosis Date Noted Date Diagnosed    History of atrial fibrillation 2024     Pyelonephritis 2024     Benign essential HTN 04/15/2021     Type 2 diabetes mellitus without complication, with long-term current use of insulin ([facility]) 04/15/2021     JULISSA (acute kidney injury) ([facility]) 04/15/2021     Morbid obesity due to excess calories ([facility]) 04/15/2021     Mixed hyperlipidemia 04/15/2021     Seizure disorder ([facility]) 04/15/2021     Chronic diastolic CHF (congestive heart failure) ([facility]) 04/15/2021       LOS (days): 4  Geometric Mean LOS (GMLOS) (days): 2.9  Days to GMLOS:-0.7     OBJECTIVE:  Risk of Unplanned Readmission Score: 11.03         Current admission status: Inpatient   Preferred Pharmacy:   [id number] - [place] [address]  Phone: [phone] Fax: [phone]    Primary Care Provider: Ruslan Morales MD    [facility]  Secondary Insurance:     DISCHARGE DETAILS:        Patient medically cleared for discharge, family will provide transportation home around 1400 on 9/10/24.                                             Treatment Team Recommendation: Home  Discharge Destination Plan:: Home  Transport at Discharge : Family           ETA of Transport (Date): 09/10/24                 IMM Given (Date):: 09/10/24  IMM Given to:: Patient   CM spoke to patient at the bedside, reviewed DC IMM with patient and informed that patient can stay an additional 4 hours for reconsidering appealing the discharge as the medicare rights were review on the day of discharge. Pt verbalized understanding and feels ready to go home and does not intend to stay 4 hours to reconsider.

## 2024-09-10 NOTE — ASSESSMENT & PLAN NOTE
Wt Readings from Last 3 Encounters:   09/10/24 (!) 155 kg (342 lb 9.6 oz)   09/06/24 (!) 158 kg (349 lb)   08/17/24 (!) 166 kg (365 lb)     Daily weights  Continue Lasix  Continue beta-blocker  Currently euvolemic

## 2024-09-10 NOTE — ASSESSMENT & PLAN NOTE
Patient is a 69-year-old male with a past medical history of hypertension, hyperlipidemia, diabetes, morbid obesity, seizure disorder, and CHF who presents to the hospital with urinary complaints.  He reports that he has had weakness dark urine and fever for the last few days, he went to urgent care who told him he may have sepsis and that he should come to the emergency room.  In the emergency room he not found to have sepsis however his urinalysis suggest infection and CAT scan suggest pyelonephritis.  He will be admitted to the hospital and treated for acute pyelonephritis.  Continue ceftriaxone   urine culture mixed contaminant.  Blood culture neg for 48 hours.  Dysuria improving with Pyridium   Will do ultrasound of the kidneys and bladder and if continues to improve will discharge home today with a course of oral antibiotics

## 2024-09-10 NOTE — ASSESSMENT & PLAN NOTE
Lab Results   Component Value Date    HGBA1C 7.4 (H) 09/07/2024       Recent Labs     09/09/24  1203 09/09/24  1609 09/09/24 2041 09/10/24  0823   POCGLU 241* 214* 210* 188*       Blood Sugar Average: Last 72 hrs:  (P) [id number]  Continue long-acting insulin 39 units every 12 hours  Sliding scale insulin due to elevated blood sugars add additional Humalog 5 units 3 times daily with meals  Diabetic diet

## 2024-09-11 NOTE — UTILIZATION REVIEW
NOTIFICATION OF ADMISSION DISCHARGE   This is a Notification of Discharge from [facility]. Please be advised that this patient has been discharge from our facility. Below you will find the admission and discharge date and time including the patient’s disposition.   UTILIZATION REVIEW CONTACT:  Randa Luke  Utilization   Network Utilization Review Department  Phone: [phone] x carefully listen to the prompts. All voicemails are confidential.  Email: [email or web address]     ADMISSION INFORMATION  PRESENTATION DATE: 9/6/2024  6:33 PM  OBERVATION ADMISSION DATE: N/A  INPATIENT ADMISSION DATE: 9/6/24  9:18 PM   DISCHARGE DATE: 9/10/2024  1:43 PM   DISPOSITION:Home/Self Care    Network Utilization Review Department  ATTENTION: Please call with any questions or concerns to [phone] and carefully listen to the prompts so that you are directed to the right person. All voicemails are confidential.   For Discharge needs, contact Care Management DC Support Team at [phone] opt. 2  Send all requests for admission clinical reviews, approved or denied determinations and any other requests to dedicated fax number below belonging to the campus where the patient is receiving treatment. List of dedicated fax numbers for the Facilities:  FACILITY NAME UR FAX NUMBER   ADMISSION DENIALS (Administrative/Medical Necessity) [phone]   DISCHARGE SUPPORT TEAM ([facility]) [phone]   PARENT CHILD HEALTH (Maternity/NICU/Pediatrics) [phone]   [facility] [phone]   [facility] [phone]   [facility] [phone]   [facility] [phone]   On license of [facility] [phone]   [facility] [phone]   [facility] [phone]   [facility]  [phone]   [facility] [phone]   [facility] [phone]   [facility] [phone]   [facility] [phone]

## 2024-09-12 LAB — BACTERIA BLD CULT: NORMAL
